# Patient Record
Sex: FEMALE | Race: WHITE | HISPANIC OR LATINO | Employment: FULL TIME | URBAN - METROPOLITAN AREA
[De-identification: names, ages, dates, MRNs, and addresses within clinical notes are randomized per-mention and may not be internally consistent; named-entity substitution may affect disease eponyms.]

---

## 2023-01-09 DIAGNOSIS — N94.810 VULVAR VESTIBULITIS: ICD-10-CM

## 2023-01-09 DIAGNOSIS — N94.10 DYSPAREUNIA IN FEMALE: Primary | ICD-10-CM

## 2023-01-12 ENCOUNTER — CLINICAL SUPPORT (OUTPATIENT)
Dept: REHABILITATION | Facility: HOSPITAL | Age: 38
End: 2023-01-12
Payer: COMMERCIAL

## 2023-01-12 DIAGNOSIS — N94.10 DYSPAREUNIA IN FEMALE: ICD-10-CM

## 2023-01-12 DIAGNOSIS — N94.810 VULVAR VESTIBULITIS: ICD-10-CM

## 2023-01-12 DIAGNOSIS — M62.838 MUSCLE SPASM: Primary | ICD-10-CM

## 2023-01-12 PROCEDURE — 97112 NEUROMUSCULAR REEDUCATION: CPT | Mod: PN | Performed by: PHYSICAL THERAPIST

## 2023-01-12 PROCEDURE — 97161 PT EVAL LOW COMPLEX 20 MIN: CPT | Mod: PN | Performed by: PHYSICAL THERAPIST

## 2023-01-12 NOTE — PATIENT INSTRUCTIONS
DIAPHRAGMATIC BREATHING     The diaphragm is a dome shaped muscle that forms the floor of the rib cage. It is the most efficient muscle for breathing and relaxation, although most people are not used to using the diaphragm. Diaphragmatic or belly breathing is an important technique to learn because it helps settle down or relax the autonomic nervous system. The correct use of diaphragmatic breathing can help to quiet brain activity resulting in the relaxation of all the muscles and organs of the body. This is accomplished by slow rhythmic breathing concentrated in the diaphragm muscle rather than the chest.    How to do proper relaxation breathing:    Start by lying on your back or reclining in a chair in a relaxed position. Place one hand on your chest and the other on your abdomen.  Relax your jaw by placing your tongue on the floor of your mouth and keeping your teeth slightly apart.   Take a deep breath in, letting the abdomen expand and rise while you keep your upper chest, neck and shoulders relaxed.   As you breathe out, allow your abdomen and chest to fall. Exhale completely.  It doesn't matter if you breathe in/out through your nose and/or mouth. Do whichever feels comfortable.  Remember to breathe slowly.  Do not force your breathing. Do not hold your breath.  Repeat for 5 minutes every day.

## 2023-01-12 NOTE — PLAN OF CARE
[facility]   Physical Therapy Initial Evaluation     Date: 2023   Name: Joselo Schaefer  Clinic Number: [id number]    Therapy Diagnosis:   Encounter Diagnoses   Name Primary?    Dyspareunia in female     Vulvar vestibulitis     Muscle spasm Yes     Physician: Tiana Jaimes MD    Physician Orders: PT Eval and Treat PF PT  Medical Diagnosis from Referral: N94.10 (ICD-10-CM) - Dyspareunia in female N94.810 (ICD-10-CM) - Vulvar vestibulitis   Evaluation Date: 2023  Authorization Period Expiration: 2024  Plan of Care Expiration: 2023  Progress Note Due: 2023  Visit #  Visits authorized:    FOTO: 1/3    Precautions: Standard     Time In: 2:41 PM   Time Out: 3:25 PM   Total Appointment Time (timed & untimed codes): 44 minutes      HISTORY      Joselo is a 37 y.o. female evaluated on 2023    Physician:  Tiana Jaimes MD   Diagnosis:   Encounter Diagnoses   Name Primary?    Dyspareunia in female     Vulvar vestibulitis     Muscle spasm Yes      Treatment ordered: Physical Therapy  Medical History: No past medical history on file.   Surgical History:   Past Surgical History:   Procedure Laterality Date     SECTION      x2    TUBAL LIGATION        Medications:   Current Outpatient Medications   Medication Sig    clotrimazole-betamethasone 1-0.05% (LOTRISONE) cream Apply twice a day as needed for itching    norethindrone (AYGESTIN) 5 mg Tab Take 1 tablet (5 mg total) by mouth once daily.     No current facility-administered medications for this visit.       Allergies:   Review of patient's allergies indicates:   Allergen Reactions    Diflucan [fluconazole] Other (See Comments)     Spots on face        Precautions: universal    OB/GYN History:  Caesarean x 2    Bladder/Bowel History: straining or pushing to empty bladder      SUBJECTIVE     Date of onset: Forever  History of current complaint: patient states constant pelvic pain. She  "was given a cream (testosterone and estrogen) from Dr. Jaimes, but it gave her a bad rash. She will see Dr. Jaimes on 1/16/23 for a follow up. Reports frequent UTIs - but when she would go to the doctor she did not have a UTI. Feels like she does not empty her bladder well. States that she has to push on her bladder. "You know the days before your period...that's how I feel forever." Patient is from [facility]. She has been in the [place] 10 years. Did see a urologist in [place] that gave her some pills. This did help her empty her bladder, but nothing helped the pain. States that she has mild pain with gyn exam. A little pain afterwards. Will bleed after that exam.   Patient's goals for therapy: decrease pain  Pain: Patient reports 7/10, with 0 being the lowest and 10 being the highest.    Activities that cause symptoms: sexual activity, prolonged sitting, voiding, and defecating    Previous treatment included medication, stretching of the bladder    Sexually active? Yes - pain during and the day after, will bleed after intercourse     Frequency of urination:   Daytime: every hour - after every class during school, drinks more water at school, does not drink as much when at home           Nighttime: 2xs     Difficulty initiating urine stream: Yes  Urine stream: very weak, spills out on her leg, she does sit to urinate  Complete emptying: No  Bladder leakage: No  Frequency of incidents: none  Amount leaked (urine): none    Frequency of bowel movements: once a day  Difficulty initiating BM: No  Quality/Shape of BM: [place] Stool Chart 4  Colon leakage: No  Frequency of incidents: none   Amount leaked (bowels):  none  Form of protection: wears a liner - has a discharge  Number of pads required in 24 hours: 1      Types of fluid intake: water, 2 cups of coffee a day  Diet:regular diet  Current exercise:daily - cardio and weights    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    OBJECTIVE     ORTHO SCREEN  Posture: WNL  Pelvic alignment: no sign of deviations noted in supine    ABDOMINALS  Scarring:  scar   Diastasis: not assessed  Abdominal strength: not assessed    Chaperone: refused  Consent signed: Yes    VAGINAL PELVIC FLOOR EXAM - will be performed next visit    TREATMENT      Neuromuscular Re-education to develop Kinesthetic and Proprioception  for 15 minutes including: diaphragmatic breathing and PF relaxation with use of rehabilitative ultrasound. Pt also with crescent sign indicative of constipation. PVR appears WNL.       Education: instructed on general anatomy/physiology of urinary/bowel system; discussed plan of care with patient and parent/guardian; instructed in benefits/risks of treatment; instructed in alternative methods of treatment; instructed in risks of refusing treatment; patient a parent agreed to treatment plan.     Also educated in: anatomy/physiology of pelvic floor and diaphragmatic breathing    ASSESSMENT      This is a 37 y.o. female referred to outpatient physical therapy and presents with a medical diagnosis of N94.10 (ICD-10-CM) - Dyspareunia in female N94.810 (ICD-10-CM) - Vulvar vestibulitis . Patient will benefit from skilled physical therapy to improve pelvic floor relaxation and mobility, improve bowel and bladder habits, and improve pelvic pain to improve her quality of life .    Educational/Spiritual/Cultural needs: none  Abuse/Neglect: no signs  Nutritional Status: WDWN   Fall Risk: pt is not a fall risk    Pt's spiritual, cultural and educational needs considered and pt agreeable to plan of care and goals as stated below:     Medical necessity is demonstrated by the following IMPAIRMENTS/PROMBLEMS     History  Co-morbidities and personal factors that may impact the plan of care Examination  Body Structures and Functions, activity limitations and participation restrictions that may impact the plan of care Clinical  Presentation   Decision Making/ Complexity Score   Co-morbidities:                 Personal Factors:    Body Regions/Systems/Functions:    poor knowledge of body mechanics and posture, dysfunctional voiding, and dysfunctional defecation           Activity limitations:   Barriers to Learning: none  Environmental Barriers: none noted    Participation Restrictions:           low           PLAN    Frequency: 1xs per week  Duration: 12 weeks    Short Term Goals: 6 weeks   Patient will report urinating ever 2-3 hours.  Patient will be independent with pelvic floor relaxation to improve bowel and bladder evacuation.   Patient will be able to demonstrate improved pelvic floor relaxation and contraction on rehabilitative ultrasound.   Patient will report improved water intake when not at work.   Patient will report pelvic pain of 5/10 at its worst to improve quality of life.    Long Term Goals: 12 weeks   Patient will report urinating every 3-4 hours with strong urine stream to improve quality of life   Patient will deny nocturia to improve sleep.   Patient will report pelvic pain of 3/10 at its worst.   Patient will demonstrate adequate pelvic floor coordination with contraction and relaxation on sEMG vs rehabilitative ultrasound.    Patient will voice compliance with progressive home exercise program.     Physical therapy will include: therapeutic exercises, therapeutic activity, neuromuscular re-education, gait training, manual therapy, patient/family education, and self care/home management  Pt may be seen by PTA as part of the rehabilitation team.     Therapist: Alvin Garcia, PT  1/12/2023

## 2023-03-07 ENCOUNTER — DOCUMENTATION ONLY (OUTPATIENT)
Dept: REHABILITATION | Facility: HOSPITAL | Age: 38
End: 2023-03-07
Payer: COMMERCIAL

## 2023-03-07 NOTE — PROGRESS NOTES
Pelvic Health Physical Therapy   Discharge Summary     Name: Joselo Schaefer  Clinic Number: [id number]     Therapy Diagnosis:        Encounter Diagnoses   Name Primary?    Dyspareunia in female      Vulvar vestibulitis      Muscle spasm Yes      Physician: Tiana Jaiems MD         Physician Orders: PT Eval and Treat PF PT  Medical Diagnosis from Referral: N94.10 (ICD-10-CM) - Dyspareunia in female N94.810 (ICD-10-CM) - Vulvar vestibulitis   Evaluation Date: 1/12/2023  Authorization Period Expiration: 1/9/2024  Plan of Care Expiration: 4/6/2023  Progress Note Due: 2/9/2023  Visit # 1/12 Visits authorized: 1/ 1   FOTO: 1/3    Cancelled Visits: 1 (2-2-23)  No Show Visits: 1 (2-8-23)      Assessment     Patient did not complete plan of care. Unsuccessful at attempts to contact patient. Patient only attended pelvic floor evaluation on 1/12/23.      Short Term Goals: 6 weeks   Patient will report urinating ever 2-3 hours. Goal not met  Patient will be independent with pelvic floor relaxation to improve bowel and bladder evacuation. Goal not met  Patient will be able to demonstrate improved pelvic floor relaxation and contraction on rehabilitative ultrasound. Goal not met  Patient will report improved water intake when not at work. Goal not met  Patient will report pelvic pain of 5/10 at its worst to improve quality of life.Goal not met     Long Term Goals: 12 weeks   Patient will report urinating every 3-4 hours with strong urine stream to improve quality of life Goal not met  Patient will deny nocturia to improve sleep. Goal not met  Patient will report pelvic pain of 3/10 at its worst. Goal not met  Patient will demonstrate adequate pelvic floor coordination with contraction and relaxation on sEMG vs rehabilitative ultrasound.  Goal not met  Patient will voice compliance with progressive home exercise program. Goal not met    Plan     Discharge physical therapy at this time.     Alvin Garcia, PT

## 2024-02-20 ENCOUNTER — OFFICE VISIT (OUTPATIENT)
Dept: INTERNAL MEDICINE | Facility: CLINIC | Age: 39
End: 2024-02-20
Payer: COMMERCIAL

## 2024-02-20 VITALS
RESPIRATION RATE: 20 BRPM | HEIGHT: 63 IN | OXYGEN SATURATION: 99 % | BODY MASS INDEX: 21.33 KG/M2 | HEART RATE: 68 BPM | SYSTOLIC BLOOD PRESSURE: 104 MMHG | WEIGHT: 120.38 LBS | DIASTOLIC BLOOD PRESSURE: 66 MMHG

## 2024-02-20 DIAGNOSIS — Z76.89 ENCOUNTER TO ESTABLISH CARE: Primary | ICD-10-CM

## 2024-02-20 DIAGNOSIS — E87.6 HYPOKALEMIA: ICD-10-CM

## 2024-02-20 DIAGNOSIS — R79.89 ABNORMAL LFTS: ICD-10-CM

## 2024-02-20 DIAGNOSIS — R53.83 FATIGUE, UNSPECIFIED TYPE: ICD-10-CM

## 2024-02-20 DIAGNOSIS — M79.7 FIBROMYALGIA: ICD-10-CM

## 2024-02-20 DIAGNOSIS — R17 ELEVATED BILIRUBIN: ICD-10-CM

## 2024-02-20 DIAGNOSIS — Z12.31 SCREENING MAMMOGRAM FOR BREAST CANCER: ICD-10-CM

## 2024-02-20 PROCEDURE — 3074F SYST BP LT 130 MM HG: CPT | Mod: CPTII,S$GLB,, | Performed by: NURSE PRACTITIONER

## 2024-02-20 PROCEDURE — 3078F DIAST BP <80 MM HG: CPT | Mod: CPTII,S$GLB,, | Performed by: NURSE PRACTITIONER

## 2024-02-20 PROCEDURE — 99999 PR PBB SHADOW E&M-EST. PATIENT-LVL IV: CPT | Mod: PBBFAC,,, | Performed by: NURSE PRACTITIONER

## 2024-02-20 PROCEDURE — 1159F MED LIST DOCD IN RCRD: CPT | Mod: CPTII,S$GLB,, | Performed by: NURSE PRACTITIONER

## 2024-02-20 PROCEDURE — 99204 OFFICE O/P NEW MOD 45 MIN: CPT | Mod: S$GLB,,, | Performed by: NURSE PRACTITIONER

## 2024-02-20 PROCEDURE — 3008F BODY MASS INDEX DOCD: CPT | Mod: CPTII,S$GLB,, | Performed by: NURSE PRACTITIONER

## 2024-02-20 RX ORDER — ZINC GLUCONATE 100 MG
100 TABLET ORAL EVERY OTHER DAY
COMMUNITY
End: 2024-02-20

## 2024-02-20 RX ORDER — VITAMIN B COMPLEX
1 CAPSULE ORAL DAILY
COMMUNITY

## 2024-02-20 NOTE — PROGRESS NOTES
Subjective:           Patient ID: Joselo Schaefer is a 39 y.o. female.    Chief Complaint: Results    Joselo Schaefer is a 39 y.o. female here to establish   Hs wellness labs from work to review;   Notes extreme fatigue and diffuse pain   Thinks she may be isamar-menopausal ; mother dx in her 30s   She follows with GYN     Prior Dx fibromyalgia   K+ low- 3.3 and   Bilirubin slightly elevated  LFTs slightly elevated   Denies recent illness,   Denies alcohol use  She follows a very healthy diet ; lipids panel excellent; HDL 90s, LDL 60s , Total chol and TG normal     She has had large panel workup a few years ago with wellness center;   Included Tsh and hormones   She was prescribed - pregnelone 10mg daily   Progenolone level 34,   Estradiol 118  Estrace 45   Testosterone 19    TSH 0.91, T4- 7.6, T3- 3.2   CRP 0.3  CHARLES,  uric acid normal, HGB A1C 4.9    Cpeptide- 0.73,   B12- 548  Folate- 10   Cbc normal             Review of Systems   Constitutional:  Positive for fatigue. Negative for chills and fever.   HENT:  Negative for congestion, ear pain, postnasal drip, sinus pressure, sneezing and sore throat.    Eyes:  Negative for discharge.   Respiratory:  Negative for cough, chest tightness and shortness of breath.    Cardiovascular: Negative.    Gastrointestinal:  Negative for abdominal pain, constipation, diarrhea, nausea and vomiting.   Genitourinary:  Negative for difficulty urinating, flank pain and hematuria.   Musculoskeletal:  Positive for myalgias. Negative for arthralgias and joint swelling.   Skin: Negative.    Neurological:  Negative for dizziness and headaches.   Psychiatric/Behavioral:  Negative for behavioral problems and confusion.        Objective:      Physical Exam  Constitutional:       Appearance: She is well-developed.   HENT:      Head: Normocephalic and atraumatic.      Nose: Nose normal.   Eyes:      Pupils: Pupils are equal, round, and reactive to light.   Neck:      Vascular: No  carotid bruit.   Cardiovascular:      Rate and Rhythm: Normal rate and regular rhythm.      Heart sounds: No murmur heard.  Pulmonary:      Effort: Pulmonary effort is normal.      Breath sounds: Normal breath sounds.   Abdominal:      General: Bowel sounds are normal.      Palpations: Abdomen is soft.   Musculoskeletal:         General: Tenderness present. Normal range of motion.      Cervical back: Normal range of motion and neck supple. No tenderness.      Comments: Diffuse tenderness to bilateral arms, chest, legs bilaterally    Lymphadenopathy:      Cervical: No cervical adenopathy.   Skin:     General: Skin is warm and dry.      Capillary Refill: Capillary refill takes less than 2 seconds.      Coloration: Skin is not jaundiced or pale.   Neurological:      Mental Status: She is alert and oriented to person, place, and time.      Cranial Nerves: No cranial nerve deficit.      Sensory: No sensory deficit.   Psychiatric:         Behavior: Behavior normal.         Thought Content: Thought content normal.         Judgment: Judgment normal.         Assessment:       1. Encounter to establish care    2. Fatigue, unspecified type    3. Abnormal LFTs    4. Fibromyalgia    5. Screening mammogram for breast cancer    6. Hypokalemia    7. Elevated bilirubin        Plan:   1. Encounter to establish care    2. Fatigue, unspecified type  -     ESTRADIOL; Future; Expected date: 02/20/2024  -     PROGESTERONE; Future; Expected date: 02/20/2024  -     FOLLICLE STIMULATING HORMONE; Future; Expected date: 02/20/2024    3. Abnormal LFTs  -     US Abdomen Limited_Liver; Future; Expected date: 02/20/2024  -     HEPATITIS C ANTIBODY; Future; Expected date: 02/20/2024  -     Comprehensive Metabolic Panel; Future; Expected date: 02/20/2024    4. Fibromyalgia    5. Screening mammogram for breast cancer  -     Mammo Digital Screening Bilat; Future; Expected date: 02/20/2024    6. Hypokalemia  -     Comprehensive Metabolic Panel; Future;  Expected date: 02/20/2024    7. Elevated bilirubin  -     Comprehensive Metabolic Panel; Future; Expected date: 02/20/2024       F/u after labs and US, MMG

## 2024-03-06 ENCOUNTER — HOSPITAL ENCOUNTER (OUTPATIENT)
Dept: RADIOLOGY | Facility: HOSPITAL | Age: 39
Discharge: HOME | End: 2024-03-06
Attending: NURSE PRACTITIONER
Payer: COMMERCIAL

## 2024-03-06 VITALS — WEIGHT: 120 LBS | BODY MASS INDEX: 21.26 KG/M2 | HEIGHT: 63 IN

## 2024-03-06 DIAGNOSIS — R79.89 ABNORMAL LFTS: ICD-10-CM

## 2024-03-06 DIAGNOSIS — Z12.31 SCREENING MAMMOGRAM FOR BREAST CANCER: ICD-10-CM

## 2024-03-06 PROCEDURE — 77067 SCR MAMMO BI INCL CAD: CPT | Mod: TC

## 2024-03-06 PROCEDURE — 76705 ECHO EXAM OF ABDOMEN: CPT | Mod: TC

## 2024-03-06 PROCEDURE — 77063 BREAST TOMOSYNTHESIS BI: CPT | Mod: 26,,, | Performed by: RADIOLOGY

## 2024-03-06 PROCEDURE — 76705 ECHO EXAM OF ABDOMEN: CPT | Mod: 26,,, | Performed by: RADIOLOGY

## 2024-03-06 PROCEDURE — 77067 SCR MAMMO BI INCL CAD: CPT | Mod: 26,,, | Performed by: RADIOLOGY

## 2024-03-08 ENCOUNTER — TELEPHONE (OUTPATIENT)
Dept: INTERNAL MEDICINE | Facility: CLINIC | Age: 39
End: 2024-03-08
Payer: COMMERCIAL

## 2024-03-08 DIAGNOSIS — R92.8 ABNORMALITY OF RIGHT BREAST ON SCREENING MAMMOGRAM: Primary | ICD-10-CM

## 2024-03-08 NOTE — TELEPHONE ENCOUNTER
OB/GYN Discharge Instructions:  Contact office with increased bleeding, fever, or increasing sadness.  Contact office with developing headache, vision changes (blurry, spots or sparkles), front right side pain just below rib cage, or heartburn that does not go away.    Go to Emergency Room with shortness of breath or chest pain.  If need to go to an emergency room, please go to [facility] in [place].    Use stool softener for at minimum 1 week or drink prune juice/eat prunes 1-2 times per day.  Pelvic rest (no intercourse) x 6 weeks.    Follow up with OB/GYN, Dr. Mahsa Cantor in 6 weeks for Post-Partum Check.  .  Prescriptions sent to preferred pharmacy.    After Your Vaginal Delivery Discharge Instructions (Dr. Cantor)    After Discharge Orders:  Please call soon to schedule a follow up appointment for 6 weeks.      Medications:   Please resume prenatal vitamins  Tylenol 325 mg or 500 mg (whichever you have at home) one or 2 tablets by mouth every 4-6 hours as needed for pain.  This is an over the counter medication  Ibuprofen 600mg take one tablet by mouth every 6 hours until you are pain free.  Then you can space it out.  Iron 325 mg one tablet by mouth daily. Start after you are moving your bowels well (about 1 week).  This is an over the counter medication.  Take prunes or prune juice twice a day for constipation.  If you can't tolerate prunes, then use Colace 100 mg.  Take one tablet by mouth daily as needed for constipation. This is an over the counter medication.    Medical equipment: none    After your delivery - Please notify your physician if you have these signs and symptoms:  You will have irregular menses/ vaginal bleeding after having a baby.  This should decrease in amount over time, but you may continue to bleed LIGHTLY for 6-8 weeks.  Your first period may be heavier than periods you had prior to having your baby.  If you develop lightheadedness, fast heartbeat, or become dizzy  Orders placed    because you are bleeding heavily, you should call your doctor.  If you are completely soaking a pad an hour, this is too much bleeding and you should call your doctor.  Fever - Oral temperature greater than 100.4 degrees Fahrenheit   Foul-smelling vaginal discharge  Headache unrelieved by \"pain meds\"  Difficulty urinating  Breasts reddened, hard, hot to the touch  Nipple discharge which is foul-smelling or contains pus  Difficulty breathing with or without chest pain  New calf pain especially if only on one side  Sudden, continuing increased vaginal bleeding with or without clots  Unrelieved feelings of:  Inability to cope  Sadness  Anxiety  Lack of interest in baby  Insomnia  Crying     What to do at home:  See patient education handouts for full information  Take it easy for 2 weeks.  After 2 weeks you can start to increase your activity.  After 3 weeks you can exercise full strength.  No driving until you are pain free.  This may take a few days or even a week or more.   Nothing per vagina for 6 weeks, no sexual intercourse, no douching, no tampons for 6 weeks  Soak in a bathtub twice a day in plain hot water.  This is especially important to heal stitches.  Drink a 3-4 liters of water per day.  This will help with milk production and avoid constipation.  Take care of yourself by sleeping/resting as much as possible   Eat regular nutritious meals  Let someone else care for you, your baby, and housework as much as possible   Take pain medication as prescribed whenever you need them  Wear compression stockings if prescribed   To avoid/relieve constipation take stool softeners if advised   Drink lots of water/fruit juices  Increase fiber in your diet  Breast care: Wear support bra    Refer to  Discharge Instructions for problems or follow-up regarding feeding  If breast feeding, Jennifer Figueroa (Nurse Practitioner) in our office is a lactation consultant.            POSTPARTUM DISCHARGE SUMMARY:    DISPOSITION  STATUS NOTE:  Date:2023Time:11:50 AM Mode:wheelchair Accompanied by:family member  Disposition:home  Outcomes achieved per plan of care.  Discharged in stable condition.    Pamphlet / Instructions given: see \"A New Beginning\" booklet,        Mom Teaching    Bleeding   Everyone's postpartum bleeding is different. Initially, your bleeding will start bright red and will transition to pink then to a brownish color over the next week to two weeks. It is common to experience abdominal cramping which can last up to four days after delivery. Bleeding on a darryl pad can last for four to six weeks; just enough that you need to wear a panty liner. If you are breastfeeding, you may get your period, or you could go up to six months to one year without one.    You could still get pregnant even if you do not have your period. Contact your doctor if you pass a blood clot larger than a golf ball or if your vaginal bleeding is soaking a pad in an hour. Do not use tampons until you are cleared by your doctor.      With a , contact your doctor if your incision is swollen, oozing, warm to the touch, or increased pain at the incision site. You will need an appointment to have your staples/stitches looked at or removed in one to two weeks.    Perineum  If you received stitches on your bottom they will dissolve and will not need to be removed. You may see a stitch in the toilet, this is OK. If provided, use Dermoplast spray or witch-jose alfredo/tucks pads to ease perineal discomfort. You can also soak your bottom in a bathtub with shallow plain warm water (no soaps or oils) for twenty minutes to help with discomfort. Continue to use your darryl bottle to rinse off your bottom until your bleeding stops to prevent infection.     Exercise   Kegel exercises can be performed to help strengthen your pelvic floor. To perform Kegel exercises, you tighten your bottom and then loosen it up. When you start to urinate and stop on purpose,  repeating those steps, you are using your Kegel muscles. No vigorous exercise for six weeks. You can go on short walks. After four weeks, you can tighten then loosen your stomach. You can also do pelvic tilts.     Postpartum Blues/Depression  It is common to have postpartum blues after delivery. Some mothers may feel emotional changes after birth including: crying, anxiety, feeling overwhelmed, or irritable. These feelings can be normal due to hormonal changes.     We are concerned if you do not want to touch/care for baby, care for yourself, not sleeping or sleeping too much, constant anxiety, or feeling complete loss of control. Contact your doctor if you are having any of these symptoms as you might be experiencing postpartum depression.     Often it is family or friends that recognize these symptoms before you notice them yourself. Postpartum depression can occur any time after delivery up to one year. It is caused by a hormonal imbalance and it is important to seek medical care. If you have feelings of harming yourself or your baby seek emergency care.     Here are some additional resources offering support for Postpartum Depression:    Postpartum Support International: (105) 727-3256   Mom's Mental Health Initiative ([facility]) https://momsmentalhealthmke.org/  Mental Health [facility] (402) 310-4451 or (474) 357-7728   National [place] for Mental Health: (765) 828-8900  National Suicide Prevention (098) 973-6464  Postpartum Progress https://postpartumprogress.com/       Postpartum Preeclampsia: A serious condition that occurs when a woman has high blood pressure and excess protein in her urine soon after childbirth. It usually occurs within a few days of birth, but can develop up to 6 weeks after having the baby. Preeclampsia can result in seizures and other serious complications and requires prompt treatment.     Call your doctor or midwife immediately if you experience any of the  following symptoms that could be signs of Preeclampsia:  Increased swelling in your face, hands or legs  severe headache that doesn't go away  abdominal pain  shortness of breath / difficulty breathing  nausea and vomiting   confusion  vision changes:blurred vision or flashing spots   gain more than 3 pounds in three days     Sex   Discuss birth control options with your doctor. If you are nursing, your vagina can become dry. Because of this, you may need to use KY jelly or a personal lubricant during sexual intercourse. You should be on vaginal rest until your six-week postpartum appointment; no tampons, douching, or sexual intercourse. If you are breastfeeding and have not gotten your period, you could still get pregnant.     Breast Care   If you are not breastfeeding, wear a bra at all times for about one week. Try not to touch or stimulate your breasts. When you take a shower, let the water hit your back instead of your breasts. If your breasts become engorged (hard and painful due to increased breast milk) make sure you are wearing a bra. Engorgement usually occurs day three to four postpartum. If it becomes very uncomfortable put an ice bag on your breasts.     If you are breastfeeding, wear a bra all the time. You may need to wear nursing pads due to breast milk leaking. Drink six to eight glasses of fluid per day. You may run a low temperature when your milk comes in, call your doctor if you have a temperature over 100.4 degrees F. Putting warm compresses on your breasts right before nursing can help with milk letdown. Lanolin cream can be applied directly to the nipples before and/or after feedings to help with soreness or cracking. Avoid using soap on the nipples- it may cause dryness or cracking.    A few problems can arise with nursing including becoming engorged, clogged duct, or mastitis. Engorgement occurs when your breasts become very firm which may cause difficulty getting the baby to latch  correctly on the breast. You may have to express some milk first, and then place the baby on the breast. This usually occurs three to four days after birth and can last forty-eight hours.   A clogged duct is when you develop a lump on your breast and the breast milk cannot flow through. You will need to apply a hot pack for twenty minutes (warm moist compress) and then place the baby to breast. Try to massage the lump out while nursing.     Lastly, you could develop a breast infection called mastitis. Your breast/nipple could become reddened and the nipple could become so tender that it is hard to allow the baby to nurse from it. You may have a fever and chills and overall not feel well. Continue to nurse and call your doctor for a prescription. Your milk is not infected and is safe for the  to continue to eat.     Urination/Bowel Movements   You may find you have to urinate more frequently than normal in the first week after delivery. If you are experiencing any urinary burning, bleeding (separate from vaginal bleeding), frequency or hesitancy call your doctor as you might have a urinary tract infection (UTI). Swelling in legs and feet is common as you are still retaining fluid from pregnancy and the delivery. Contact your doctor if your swelling becomes a concern.     You should have a bowel movement in a day or two after delivery. Your stitches will not rip open. It is important to drink plenty of fluids and to take a stool softener if recommended by your doctor.       Diet   Eat a well-balance diet including plenty of fiber and fluids such as water and juices. Continue taking a prenatal or women's multivitamin daily if recommended by your doctor.     Activity Overview  Bathing: Shower or bathe as needed  Driving: According to your doctor  Return to work or school: According to your doctor  Lifting/bending: Lift nothing heavier than 10 pounds, bend at the knees, not at the hips  Stair climbing: Limit  trips  Resuming sexual activity: According to your doctor  Housework: Limit for first 2 weeks  Exercise: Walking is fine, check with your doctor about more strenuous activities  Other: No douching or tampons     Self Care (pg 6-8)  Vaginal discharge may continue for ten days to six weeks, becoming light in color and amount.  Continue to use your darryl bottle, witch hazel/tucks, Dermoplast spray, or sitz bath until bleeding stops or discomfort is gone.  Breast care: Use lanolin ointment/cream as needed.  Apply warm compresses five minutes prior to breastfeeding to relieve engorgement.   If you had a  section or tubal ligation, keep incision clean and dry.  Manage time in order to allow yourself enough time for rest and sleep. Try taking several short naps a day and sleep when baby sleeps.  Accept help from friends/family if offered including meals, housework or help with baby.   Local Community Resources:            [facility]: [phone]  [facility]: [phone]  [facility]: [phone]    Notify your doctor/midwife if:  Your temperature is over 100.4 degrees F.  You notice hard, red, warm, or painful areas in your breasts or legs.  Your vaginal discharge becomes foul-smelling, increases in amount, soaks more than one pad in one hour, is bright red, or you pass clots bigger than a golf ball.  You have difficulty going to the bathroom.  You notice swelling, drainage, warmth, separation, or tenderness at your incision.  You have symptoms of postpartum depression (loss of appetite, feelings of hopelessness or loss of control, inability to sleep).  You have a severe headache, increased swelling in hands/feet, blurry vision, seeing spots, pain in your right upper abdomen, and/or elevated blood pressures.  You should not have any warmth, redness, or pain in your calf or legs. This could mean you have a blood clot and should call your doctor  immediately.     Follow-up Care  Please call your doctor's office to schedule your six week follow-up appointment for a vaginal delivery. Follow up with your doctor in one to two weeks after a  section delivery    Referral(s): No       To:    Special Instructions:      A copy of this form was provided to and reviewed with Anabell Jerome by  ROQUE, 2023.  Patient verbalized understanding and has no further questions at this time.

## 2024-03-08 NOTE — PROGRESS NOTES
Please call the patient regarding her abnormal result. Right breast focal asymmetry at the upper central posterior position. Radiologist recommending Diagnostic MMG and or US ; will place orders;   Please note/report  labs and Abdominal US when calling

## 2024-06-12 ENCOUNTER — HOSPITAL ENCOUNTER (OUTPATIENT)
Dept: RADIOLOGY | Facility: HOSPITAL | Age: 39
Discharge: HOME | End: 2024-06-12
Attending: NURSE PRACTITIONER
Payer: COMMERCIAL

## 2024-06-12 DIAGNOSIS — R92.8 ABNORMALITY OF RIGHT BREAST ON SCREENING MAMMOGRAM: ICD-10-CM

## 2024-06-12 PROCEDURE — 77066 DX MAMMO INCL CAD BI: CPT | Mod: 26,,, | Performed by: RADIOLOGY

## 2024-06-12 PROCEDURE — 77066 DX MAMMO INCL CAD BI: CPT | Mod: TC

## 2024-06-12 PROCEDURE — 76642 ULTRASOUND BREAST LIMITED: CPT | Mod: 26,50,, | Performed by: RADIOLOGY

## 2024-06-12 PROCEDURE — 77062 BREAST TOMOSYNTHESIS BI: CPT | Mod: 26,,, | Performed by: RADIOLOGY

## 2024-06-12 PROCEDURE — 76642 ULTRASOUND BREAST LIMITED: CPT | Mod: TC,50

## 2024-06-12 NOTE — PROGRESS NOTES
Normal result; there right breast shows a simple cyst with clear margins  NO suspicious masses or evidence of malignancy   Will plan for repeat yearly MMG screening

## 2024-12-02 PROBLEM — R10.2 PELVIC PAIN: Chronic | Status: ACTIVE | Noted: 2024-12-02

## 2024-12-02 PROBLEM — N92.1 MENOMETRORRHAGIA: Chronic | Status: ACTIVE | Noted: 2024-12-02

## 2024-12-02 PROBLEM — N94.10 DYSPAREUNIA, FEMALE: Chronic | Status: ACTIVE | Noted: 2024-12-02

## 2024-12-02 PROBLEM — N94.5 SECONDARY DYSMENORRHEA: Status: ACTIVE | Noted: 2024-12-02

## 2024-12-03 PROBLEM — R33.9 INCOMPLETE BLADDER EMPTYING: Status: ACTIVE | Noted: 2024-12-03

## 2024-12-03 PROBLEM — N30.11 INTERSTITIAL CYSTITIS (CHRONIC) WITH HEMATURIA: Status: ACTIVE | Noted: 2024-12-03

## 2024-12-14 PROBLEM — N92.1 MENOMETRORRHAGIA: Chronic | Status: RESOLVED | Noted: 2024-12-02 | Resolved: 2024-12-14

## 2024-12-14 PROBLEM — R10.2 PELVIC PAIN: Chronic | Status: RESOLVED | Noted: 2024-12-02 | Resolved: 2024-12-14

## 2024-12-14 PROBLEM — N80.9 ENDOMETRIOSIS: Chronic | Status: ACTIVE | Noted: 2024-12-14

## 2024-12-14 PROBLEM — N94.10 DYSPAREUNIA, FEMALE: Chronic | Status: RESOLVED | Noted: 2024-12-02 | Resolved: 2024-12-14

## 2024-12-14 PROBLEM — N94.5 SECONDARY DYSMENORRHEA: Status: RESOLVED | Noted: 2024-12-02 | Resolved: 2024-12-14

## 2025-03-03 ENCOUNTER — TELEPHONE (OUTPATIENT)
Dept: OBSTETRICS AND GYNECOLOGY | Facility: CLINIC | Age: 40
End: 2025-03-03
Payer: COMMERCIAL

## 2025-03-11 ENCOUNTER — OFFICE VISIT (OUTPATIENT)
Dept: GASTROENTEROLOGY | Facility: CLINIC | Age: 40
End: 2025-03-11
Payer: COMMERCIAL

## 2025-03-11 VITALS
HEIGHT: 63 IN | BODY MASS INDEX: 21.53 KG/M2 | WEIGHT: 121.5 LBS | HEART RATE: 99 BPM | SYSTOLIC BLOOD PRESSURE: 115 MMHG | DIASTOLIC BLOOD PRESSURE: 80 MMHG

## 2025-03-11 DIAGNOSIS — R14.0 BLOATING: ICD-10-CM

## 2025-03-11 DIAGNOSIS — R10.84 GENERALIZED ABDOMINAL PAIN: ICD-10-CM

## 2025-03-11 DIAGNOSIS — K59.00 CONSTIPATION, UNSPECIFIED CONSTIPATION TYPE: Primary | ICD-10-CM

## 2025-03-11 PROCEDURE — 99999 PR PBB SHADOW E&M-EST. PATIENT-LVL IV: CPT | Mod: PBBFAC,,, | Performed by: NURSE PRACTITIONER

## 2025-03-11 PROCEDURE — 3008F BODY MASS INDEX DOCD: CPT | Mod: CPTII,S$GLB,, | Performed by: NURSE PRACTITIONER

## 2025-03-11 PROCEDURE — 99204 OFFICE O/P NEW MOD 45 MIN: CPT | Mod: S$GLB,,, | Performed by: NURSE PRACTITIONER

## 2025-03-11 PROCEDURE — 3074F SYST BP LT 130 MM HG: CPT | Mod: CPTII,S$GLB,, | Performed by: NURSE PRACTITIONER

## 2025-03-11 PROCEDURE — 1159F MED LIST DOCD IN RCRD: CPT | Mod: CPTII,S$GLB,, | Performed by: NURSE PRACTITIONER

## 2025-03-11 PROCEDURE — 3079F DIAST BP 80-89 MM HG: CPT | Mod: CPTII,S$GLB,, | Performed by: NURSE PRACTITIONER

## 2025-03-11 PROCEDURE — 1160F RVW MEDS BY RX/DR IN RCRD: CPT | Mod: CPTII,S$GLB,, | Performed by: NURSE PRACTITIONER

## 2025-03-11 RX ORDER — CHOLECALCIFEROL (VITAMIN D3) 25 MCG
10000 TABLET ORAL DAILY
COMMUNITY

## 2025-03-11 RX ORDER — MULTIVIT WITH MINERALS/HERBS
1 TABLET ORAL DAILY
COMMUNITY

## 2025-03-12 ENCOUNTER — RESULTS FOLLOW-UP (OUTPATIENT)
Dept: GASTROENTEROLOGY | Facility: CLINIC | Age: 40
End: 2025-03-12

## 2025-03-13 RX ORDER — DICYCLOMINE HYDROCHLORIDE 20 MG/1
20 TABLET ORAL 3 TIMES DAILY PRN
Qty: 90 TABLET | Refills: 2 | Status: SHIPPED | OUTPATIENT
Start: 2025-03-13

## 2025-03-13 NOTE — PROGRESS NOTES
Subjective:       Patient ID: Joselo Schaefer is a 40 y.o. female.    Chief Complaint: Bloated and Constipation    39 y/o female with hx of fibromyalgia presents to clinic with c/o lower abdominal pain, bloating, and constipation. Patient states symptoms started after hysterectomy 2024. She reports constant generalized abdominal pain worse in the lower abdomen. Pain described as cramping and entire abdomen is tender to touch. No associated nausea, vomiting. Pain worse with certain movements. No relief with BM. She is constipated. Has BM every 2-3 days. Has tried OTC stool softeners with some relief. No hematochezia or melena. She has CT A/P scheduled later this week.           Past Medical History:   Diagnosis Date    Fibromyalgia        Past Surgical History:   Procedure Laterality Date     SECTION      x2    CHOLECYSTECTOMY      CYSTOSCOPY WITH HYDRODISTENSION OF BLADDER N/A 2024    Procedure: CYSTOSCOPY, WITH BLADDER HYDRODISTENSION W/DMSO;  Surgeon: Dante Cadet MD;  Location: [facility] OR;  Service: Urology;  Laterality: N/A;    DILATION OF URETHRA N/A 2024    Procedure: DILATION, URETHRA - BU;  Surgeon: Dante Cadet MD;  Location: [facility] OR;  Service: Urology;  Laterality: N/A;    EYE SURGERY      as a child    HYSTERECTOMY, TOTAL, LAPAROSCOPIC, WITH SALPINGECTOMY Bilateral 2024    Procedure: HYSTERECTOMY,TOTAL,LAPAROSCOPIC,WITH SALPINGECTOMY;  Surgeon: Kira Godwin MD;  Location: [facility] OR;  Service: OB/GYN;  Laterality: Bilateral;    LAPAROSCOPIC CHOLECYSTECTOMY      LAPAROSCOPIC OOPHORECTOMY Right 2024    Procedure: OOPHORECTOMY, LAPAROSCOPIC;  Surgeon: Kira Godwin MD;  Location: [facility] OR;  Service: OB/GYN;  Laterality: Right;    SURGICAL REMOVAL OF ENDOMETRIOSIS N/A 2024    Procedure: DESTRUCTION, ENDOMETRIOSIS;  Surgeon: Kira Godwin MD;  Location: [facility] OR;  Service: OB/GYN;  Laterality: N/A;     TUBAL LIGATION  2015       Family History   Problem Relation Name Age of Onset    Hypertension Mother      Diabetes Father      Breast cancer Maternal Aunt      Ovarian cancer Maternal Aunt      Colon cancer Neg Hx         Social History     Socioeconomic History    Marital status:    Tobacco Use    Smoking status: Never     Passive exposure: Never    Smokeless tobacco: Never   Substance and Sexual Activity    Alcohol use: No    Drug use: No    Sexual activity: Yes     Partners: Male     Birth control/protection: None     Social Drivers of Health     Financial Resource Strain: Low Risk  (12/13/2024)    Overall Financial Resource Strain (CARDIA)     Difficulty of Paying Living Expenses: Not hard at all   Food Insecurity: No Food Insecurity (12/13/2024)    Hunger Vital Sign     Worried About Running Out of Food in the Last Year: Never true     Ran Out of Food in the Last Year: Never true   Transportation Needs: No Transportation Needs (12/13/2024)    TRANSPORTATION NEEDS     Transportation : No   Physical Activity: Sufficiently Active (11/4/2024)    Exercise Vital Sign     Days of Exercise per Week: 4 days     Minutes of Exercise per Session: 70 min   Stress: No Stress Concern Present (12/13/2024)    [place] [employer] - Occupational Stress Questionnaire     Feeling of Stress : Not at all   Recent Concern: Stress - Stress Concern Present (11/4/2024)    [place] [employer] - Occupational Stress Questionnaire     Feeling of Stress : To some extent   Housing Stability: Unknown (12/13/2024)    Housing Stability Vital Sign     Unable to Pay for Housing in the Last Year: No     Homeless in the Last Year: No   Recent Concern: Housing Stability - High Risk (11/4/2024)    Housing Stability Vital Sign     Unable to Pay for Housing in the Last Year: Yes       Review of Systems   Constitutional:  Negative for appetite change and unexpected weight change.   HENT:  Negative for  "trouble swallowing.    Respiratory:  Negative for shortness of breath.    Cardiovascular:  Negative for chest pain.   Gastrointestinal:  Positive for abdominal pain and constipation. Negative for blood in stool, nausea, rectal pain and vomiting.   Hematological:  Negative for adenopathy. Does not bruise/bleed easily.   Psychiatric/Behavioral:  Negative for dysphoric mood.          Objective:     Vitals:    03/11/25 0956   BP: 115/80   BP Location: Left arm   Patient Position: Sitting   Pulse: 99   Weight: 55.1 kg (121 lb 7.6 oz)   Height: 5' 3" (1.6 m)          Physical Exam  Constitutional:       General: She is not in acute distress.  HENT:      Head: Normocephalic.   Eyes:      Conjunctiva/sclera: Conjunctivae normal.   Pulmonary:      Effort: Pulmonary effort is normal. No respiratory distress.   Abdominal:      General: Bowel sounds are normal.      Palpations: Abdomen is soft.      Tenderness: There is abdominal tenderness (generalized abdominal tenderness with light palpation). There is no guarding or rebound.   Musculoskeletal:         General: Normal range of motion.      Cervical back: Normal range of motion.   Skin:     General: Skin is warm and dry.   Neurological:      Mental Status: She is alert and oriented to person, place, and time.   Psychiatric:         Mood and Affect: Mood normal.         Behavior: Behavior normal.     Xray of abdomen was independently visualized and reviewed by me and showed moderate stool burden.            Assessment:         ICD-10-CM ICD-9-CM   1. Constipation, unspecified constipation type  K59.00 564.00   2. Generalized abdominal pain  R10.84 789.07   3. Bloating  R14.0 787.3       Plan:       Constipation, unspecified constipation type  -     X-Ray Abdomen Flat And Erect; Future; Expected date: 03/11/2025  -     Fiber supplement daily and Miralax 2-3x/week    Generalized abdominal pain  -     X-Ray Abdomen Flat And Erect; Future; Expected date: 03/11/2025  -     " dicyclomine (BENTYL) 20 mg tablet; Take 1 tablet (20 mg total) by mouth 3 (three) times daily as needed (abdominal pain).  Dispense: 90 tablet; Refill: 2    Bloating  -     X-Ray Abdomen Flat And Erect; Future; Expected date: 03/11/2025  - recommend OTC simethicone as directed, such as Phazyme or Gas-x  -discussed with patient about low gas diet: Reduce or eliminate these foods from your diet: Broccoli, Cauliflower, [place] sprouts, Cabbage, Cooked dried beans, Carbonated beverages (sparkling water, soda, beer, champagne)  Other Causes Of Excess Gas Include:   1) EATING TOO FAST or TALKING WHILE YOU CHEW may cause you to swallow air. This increases the amount of gas in the stomach and may worsen your symptoms.  --> Chew each mouthful completely before swallowing. Take your time.  2) OVEREATING may increase the feeling of being bloated and cause more gas.  --> When you are full, stop eating.  3) CONSTIPATION can increase the amount of normal intestinal gas.  --> Avoid constipation by increasing the amount of fiber in your diet by including whole cereal grains, fresh vegetables (except those in the above list) and fresh fruits. High-fiber foods absorb water and carry it out of the body. When increasing the amount of fiber in your diet, you also need to increase the amount of water that you drink. You should drink at least eight 8-ounce glasses of water (two quarts) per day.        Follow up if symptoms worsen or fail to improve.     Patient's Medications   New Prescriptions    DICYCLOMINE (BENTYL) 20 MG TABLET    Take 1 tablet (20 mg total) by mouth 3 (three) times daily as needed (abdominal pain).   Previous Medications    B COMPLEX VITAMINS TABLET    Take 1 tablet by mouth once daily.    MEDROXYPROGESTERONE (DEPO-SUBQ PROVERA) 104 MG/0.65 ML INJECTION    Inject 0.65 mLs (104 mg total) into the skin every 3 (three) months.    NAPROXEN SODIUM (ANAPROX) 550 MG TABLET    Take 1 tablet (550 mg total) by mouth 3 (three)  times daily with meals.    OXYCODONE-ACETAMINOPHEN (PERCOCET) 5-325 MG PER TABLET    Take 1 tablet by mouth every 4 (four) hours as needed.    VITAMIN D (VITAMIN D3) 1000 UNITS TAB    Take 10,000 Units by mouth once daily.   Modified Medications    No medications on file   Discontinued Medications    No medications on file

## 2025-03-18 ENCOUNTER — OFFICE VISIT (OUTPATIENT)
Dept: OBSTETRICS AND GYNECOLOGY | Facility: CLINIC | Age: 40
End: 2025-03-18
Payer: COMMERCIAL

## 2025-03-18 VITALS
HEART RATE: 92 BPM | HEIGHT: 63 IN | BODY MASS INDEX: 21.82 KG/M2 | SYSTOLIC BLOOD PRESSURE: 110 MMHG | DIASTOLIC BLOOD PRESSURE: 66 MMHG | WEIGHT: 123.13 LBS

## 2025-03-18 DIAGNOSIS — Z79.890 HORMONE REPLACEMENT THERAPY (HRT): ICD-10-CM

## 2025-03-18 DIAGNOSIS — K59.01 SLOW TRANSIT CONSTIPATION: ICD-10-CM

## 2025-03-18 DIAGNOSIS — N95.1 PERIMENOPAUSAL VASOMOTOR SYMPTOMS: Primary | ICD-10-CM

## 2025-03-18 PROCEDURE — 99203 OFFICE O/P NEW LOW 30 MIN: CPT | Mod: S$GLB,,, | Performed by: OBSTETRICS & GYNECOLOGY

## 2025-03-18 PROCEDURE — 3078F DIAST BP <80 MM HG: CPT | Mod: CPTII,S$GLB,, | Performed by: OBSTETRICS & GYNECOLOGY

## 2025-03-18 PROCEDURE — 3074F SYST BP LT 130 MM HG: CPT | Mod: CPTII,S$GLB,, | Performed by: OBSTETRICS & GYNECOLOGY

## 2025-03-18 PROCEDURE — 1159F MED LIST DOCD IN RCRD: CPT | Mod: CPTII,S$GLB,, | Performed by: OBSTETRICS & GYNECOLOGY

## 2025-03-18 PROCEDURE — 99999 PR PBB SHADOW E&M-EST. PATIENT-LVL III: CPT | Mod: PBBFAC,,, | Performed by: OBSTETRICS & GYNECOLOGY

## 2025-03-18 PROCEDURE — 3008F BODY MASS INDEX DOCD: CPT | Mod: CPTII,S$GLB,, | Performed by: OBSTETRICS & GYNECOLOGY

## 2025-03-18 RX ORDER — BUSPIRONE HYDROCHLORIDE 5 MG/1
5 TABLET ORAL DAILY
COMMUNITY
Start: 2025-03-12

## 2025-03-18 RX ORDER — ESTRADIOL 0.5 MG/1
0.5 TABLET ORAL DAILY
Qty: 30 TABLET | Refills: 0 | Status: SHIPPED | OUTPATIENT
Start: 2025-03-18 | End: 2026-03-18

## 2025-03-18 NOTE — PROGRESS NOTES
Subjective:    Patient ID: Joselo Schaefer is a 40 y.o. y.o. female.     Chief Complaint:   Chief Complaint   Patient presents with    Cramping       History of Present Illness:  Joselo presents today complaining of chronic abdominal pain since her hsyterectomy  on 12/13. She was seen in the ED on 12/17 with fever and abdominal pain. She reports she was followed up by her GYN and treated for an infection. She has been seen by GI on 3/13 for the constipation, bloating and abdominal pain. CT scan done from ED visit on 12/17 revealed an ileus.     Patient also reports having hot flashes, night sweats and fatigue since surgery.     A full discussion of the benefit-risk ratio of hormonal replacement therapy was carried out. Improvement in vasomotor and other climacteric symptoms were discussed, including possible improvements in sleep and mood. Reduction of risk for osteoporosis was explained. We discussed the study data showing increased risk of thrombo-embolic events such as myocardial infarction, stroke and also possibly breast cancer with estrogen replacement, and how this might affect her. The range of side effects such as breast tenderness, weight gain and including possible increases in lifetime risk of breast cancer and possible thrombotic complications was discussed. We also discussed ACOG's recommendation to use hormone replacement therapy for the relief of hot flashes alone and to be on the lowest dose possible for the shortest amount of time.  Alternative such as herbal and soy-based products were reviewed as well as behavioral modifications and non hormonal prescription medications. All of her questions about this therapy were answered.        ROS:   Review of Systems   Constitutional:  Negative for activity change, appetite change, chills, diaphoresis, fatigue, fever and unexpected weight change.   HENT:  Negative for mouth sores and tinnitus.    Eyes:  Negative for discharge and visual  disturbance.   Respiratory:  Negative for cough, shortness of breath and wheezing.    Cardiovascular:  Negative for chest pain, palpitations and leg swelling.   Gastrointestinal:  Positive for abdominal pain, bloating and constipation. Negative for blood in stool, diarrhea, nausea and vomiting.   Endocrine: Positive for hot flashes. Negative for diabetes, hair loss, hyperthyroidism and hypothyroidism.   Genitourinary:  Positive for pelvic pain. Negative for dysuria, flank pain, frequency, genital sores, hematuria, urgency, vaginal bleeding, vaginal discharge, vaginal pain, postcoital bleeding and vaginal odor.   Musculoskeletal:  Negative for back pain, joint swelling and myalgias.   Integumentary:  Negative for rash, acne, breast mass, nipple discharge and breast skin changes.   Neurological:  Negative for seizures, syncope, numbness and headaches.   Hematological:  Negative for adenopathy. Does not bruise/bleed easily.   Psychiatric/Behavioral:  Positive for sleep disturbance. Negative for depression. The patient is not nervous/anxious.    Breast: Negative for mass, mastodynia, nipple discharge and skin changes          Objective:    Vital Signs:  Vitals:    03/18/25 1527   BP: 110/66   Pulse: 92       Physical Exam:  General:  alert, cooperative, no distress   Head Normocephalic, without obvious abnormality, atraumatic   Neck .supple, symmetrical, trachea midline   Skin:  Skin color, texture, turgor normal. No rashes or lesions   Abdomen:  soft, non-tender; bowel sounds normal   Pelvis: Deferred   Extremities extremities normal, atraumatic, no cyanosis or edema   Neurologic Grossly normal        Perimenopausal vasomotor symptoms  -     estradioL (ESTRACE) 0.5 MG tablet; Take 1 tablet (0.5 mg total) by mouth once daily.  Dispense: 30 tablet; Refill: 0    Slow transit constipation    Hormone replacement therapy (HRT)  -     estradioL (ESTRACE) 0.5 MG tablet; Take 1 tablet (0.5 mg total) by mouth once daily.   Dispense: 30 tablet; Refill: 0      Bowel regimen  F/u in one month on HRT

## 2025-03-26 ENCOUNTER — PATIENT MESSAGE (OUTPATIENT)
Dept: OBSTETRICS AND GYNECOLOGY | Facility: CLINIC | Age: 40
End: 2025-03-26
Payer: COMMERCIAL

## 2025-03-31 ENCOUNTER — TELEPHONE (OUTPATIENT)
Dept: OBSTETRICS AND GYNECOLOGY | Facility: CLINIC | Age: 40
End: 2025-03-31
Payer: COMMERCIAL

## 2025-03-31 NOTE — TELEPHONE ENCOUNTER
Patient reports that she is still having the abdominal pain even though she had her procedure. She reports that she is no longer having constipation. She wanted to come in and discuss it with Dr. Streeter. Patient was given appointment date and time and verbalized understanding.

## 2025-03-31 NOTE — TELEPHONE ENCOUNTER
----- Message from Jeanne sent at 3/31/2025  1:38 PM CDT -----  Contact: Self  Joselo Schaefer SilvanoMRN: [id number] Phone      Not on file.Work Phone      Not on file.Mobile          [phone] Care Team:Keysha Arana FNP as PCP - General (Internal Medicine)Tatianna Wu MD as Consulting Physician (Obstetrics and Gynecology)[facility] phone number can you be reached at? [phone]: pt states she had surgery in December but is still having lower abdominal pain

## 2025-04-01 ENCOUNTER — OFFICE VISIT (OUTPATIENT)
Dept: OBSTETRICS AND GYNECOLOGY | Facility: CLINIC | Age: 40
End: 2025-04-01
Payer: COMMERCIAL

## 2025-04-01 VITALS
BODY MASS INDEX: 21.79 KG/M2 | WEIGHT: 123 LBS | HEART RATE: 75 BPM | HEIGHT: 63 IN | DIASTOLIC BLOOD PRESSURE: 80 MMHG | SYSTOLIC BLOOD PRESSURE: 134 MMHG

## 2025-04-01 DIAGNOSIS — R10.2 CHRONIC PELVIC PAIN IN FEMALE: ICD-10-CM

## 2025-04-01 DIAGNOSIS — M62.89 PELVIC FLOOR DYSFUNCTION IN FEMALE: Primary | ICD-10-CM

## 2025-04-01 DIAGNOSIS — R39.9 UTI SYMPTOMS: ICD-10-CM

## 2025-04-01 DIAGNOSIS — G89.29 CHRONIC PELVIC PAIN IN FEMALE: ICD-10-CM

## 2025-04-01 LAB
BILIRUB SERPL-MCNC: NEGATIVE MG/DL
BLOOD URINE, POC: NORMAL
CLARITY, POC UA: CLEAR
COLOR, POC UA: YELLOW
GLUCOSE UR QL STRIP: NEGATIVE
KETONES UR QL STRIP: NEGATIVE
LEUKOCYTE ESTERASE URINE, POC: NEGATIVE
NITRITE, POC UA: NEGATIVE
PH, POC UA: 6
PROTEIN, POC: NEGATIVE
SPECIFIC GRAVITY, POC UA: <=1.005
UROBILINOGEN, POC UA: NORMAL

## 2025-04-01 PROCEDURE — 99999 PR PBB SHADOW E&M-EST. PATIENT-LVL III: CPT | Mod: PBBFAC,,, | Performed by: OBSTETRICS & GYNECOLOGY

## 2025-04-01 PROCEDURE — 3079F DIAST BP 80-89 MM HG: CPT | Mod: CPTII,S$GLB,, | Performed by: OBSTETRICS & GYNECOLOGY

## 2025-04-01 PROCEDURE — 3075F SYST BP GE 130 - 139MM HG: CPT | Mod: CPTII,S$GLB,, | Performed by: OBSTETRICS & GYNECOLOGY

## 2025-04-01 PROCEDURE — 99213 OFFICE O/P EST LOW 20 MIN: CPT | Mod: S$GLB,,, | Performed by: OBSTETRICS & GYNECOLOGY

## 2025-04-01 PROCEDURE — 81002 URINALYSIS NONAUTO W/O SCOPE: CPT | Mod: S$GLB,,, | Performed by: OBSTETRICS & GYNECOLOGY

## 2025-04-01 PROCEDURE — 3008F BODY MASS INDEX DOCD: CPT | Mod: CPTII,S$GLB,, | Performed by: OBSTETRICS & GYNECOLOGY

## 2025-04-01 PROCEDURE — 1159F MED LIST DOCD IN RCRD: CPT | Mod: CPTII,S$GLB,, | Performed by: OBSTETRICS & GYNECOLOGY

## 2025-04-01 NOTE — PROGRESS NOTES
"  Subjective:    Patient ID: Joselo Schaefer is a 40 y.o. y.o. female.     Chief Complaint:   Chief Complaint   Patient presents with    Abdominal Pain       History of Present Illness:  Joselo presents today for a follow up. She was seen on 3/18. Per note "complaining of chronic abdominal pain since her hsyterectomy  on 12/13. She was seen in the ED on 12/17 with fever and abdominal pain. She reports she was followed up by her GYN and treated for an infection. She has been seen by GI on 3/13 for the constipation, bloating and abdominal pain. CT scan done from ED visit on 12/17 revealed an ileus"     Abdominal Xray 3/11/25   Moderate constipation    CT 3/14/25  Prior cholecystectomy and hysterectomy.  Unremarkable liver, kidneys, adrenals, spleen and pancreas.  No enlarged lymph nodes.  No dilated bowel.  Unremarkable appendix.  No free air, unexpected fluid collections or focal inflammatory changes.  As before, dystrophic left hepatic lobe calcifications.  No pathologically enlarged lymph nodes.    She has done a bowel prep with no relief of her pain. She reports her HRT is helping with her moods and hot flashes.     Reports pain is present with urinating and movement. Pain has been present since prior to hysterectomy.            ROS:   Review of Systems   Constitutional:  Negative for activity change, appetite change, chills, diaphoresis, fatigue, fever and unexpected weight change.   HENT:  Negative for mouth sores and tinnitus.    Eyes:  Negative for discharge and visual disturbance.   Respiratory:  Negative for cough, shortness of breath and wheezing.    Cardiovascular:  Negative for chest pain, palpitations and leg swelling.   Gastrointestinal:  Positive for abdominal pain and constipation. Negative for bloating, blood in stool, diarrhea, nausea and vomiting.   Endocrine: Negative for diabetes, hair loss, hot flashes, hyperthyroidism and hypothyroidism.   Genitourinary:  Positive for dyspareunia and " pelvic pain. Negative for dysuria, flank pain, frequency, genital sores, hematuria, urgency, vaginal bleeding, vaginal discharge, vaginal pain, postcoital bleeding and vaginal odor.   Musculoskeletal:  Negative for back pain, joint swelling and myalgias.   Integumentary:  Negative for rash, acne, breast mass, nipple discharge and breast skin changes.   Neurological:  Positive for headaches. Negative for seizures, syncope and numbness.   Hematological:  Negative for adenopathy. Does not bruise/bleed easily.   Psychiatric/Behavioral:  Negative for depression and sleep disturbance. The patient is not nervous/anxious.    Breast: Negative for mass, mastodynia, nipple discharge and skin changes          Objective:    Vital Signs:  Vitals:    04/01/25 1540   BP: 134/80   Pulse: 75       Physical Exam:  General:  alert, cooperative, no distress   Head Normocephalic, without obvious abnormality, atraumatic   Neck .supple, symmetrical, trachea midline   Skin:  Skin color, texture, turgor normal. No rashes or lesions   Abdomen:  soft, non-tender; bowel sounds normal   Pelvis: Deferred   Extremities extremities normal, atraumatic, no cyanosis or edema   Neurologic Grossly normal        Pelvic floor dysfunction in female  -     US Pelvis Comp with Transvag NON-OB (xpd; Future; Expected date: 04/01/2025  -     Ambulatory Referral/Consult to Physical Therapy; Future; Expected date: 04/08/2025    UTI symptoms  -     POCT URINE DIPSTICK WITHOUT MICROSCOPE    Chronic pelvic pain in female  -     US Pelvis Comp with Transvag NON-OB (xpd; Future; Expected date: 04/01/2025  -     Ambulatory Referral/Consult to Physical Therapy; Future; Expected date: 04/08/2025

## 2025-04-07 ENCOUNTER — PATIENT MESSAGE (OUTPATIENT)
Dept: OBSTETRICS AND GYNECOLOGY | Facility: CLINIC | Age: 40
End: 2025-04-07
Payer: COMMERCIAL

## 2025-04-10 DIAGNOSIS — Z79.890 HORMONE REPLACEMENT THERAPY (HRT): ICD-10-CM

## 2025-04-10 DIAGNOSIS — N95.1 PERIMENOPAUSAL VASOMOTOR SYMPTOMS: ICD-10-CM

## 2025-04-10 RX ORDER — ESTRADIOL 0.5 MG/1
0.5 TABLET ORAL DAILY
Qty: 30 TABLET | Refills: 0 | Status: SHIPPED | OUTPATIENT
Start: 2025-04-10 | End: 2026-04-10

## 2025-04-10 NOTE — TELEPHONE ENCOUNTER
Refill Routing Note   Medication(s) are not appropriate for processing by [facility] for the following reason(s):        New or recently adjusted medication    [facility] action(s):  Defer               Appointments  past 12m or future 3m with PCP    Date Provider   Last Visit   4/1/2025 Tatianna Wu MD   Next Visit   Visit date not found Tatianna Wu MD   ED visits in past 90 days: 0        Note composed:1:12 PM 04/10/2025

## 2025-04-11 ENCOUNTER — HOSPITAL ENCOUNTER (OUTPATIENT)
Dept: RADIOLOGY | Facility: HOSPITAL | Age: 40
Discharge: HOME | End: 2025-04-11
Attending: OBSTETRICS & GYNECOLOGY
Payer: COMMERCIAL

## 2025-04-11 DIAGNOSIS — R10.2 CHRONIC PELVIC PAIN IN FEMALE: ICD-10-CM

## 2025-04-11 DIAGNOSIS — G89.29 CHRONIC PELVIC PAIN IN FEMALE: ICD-10-CM

## 2025-04-11 DIAGNOSIS — M62.89 PELVIC FLOOR DYSFUNCTION IN FEMALE: ICD-10-CM

## 2025-04-11 PROCEDURE — 76856 US EXAM PELVIC COMPLETE: CPT | Mod: 26,,, | Performed by: RADIOLOGY

## 2025-04-11 PROCEDURE — 76830 TRANSVAGINAL US NON-OB: CPT | Mod: 26,,, | Performed by: RADIOLOGY

## 2025-04-11 PROCEDURE — 76856 US EXAM PELVIC COMPLETE: CPT | Mod: TC

## 2025-04-15 ENCOUNTER — PATIENT MESSAGE (OUTPATIENT)
Dept: OBSTETRICS AND GYNECOLOGY | Facility: CLINIC | Age: 40
End: 2025-04-15
Payer: COMMERCIAL

## 2025-04-15 DIAGNOSIS — Z79.890 HORMONE REPLACEMENT THERAPY (HRT): ICD-10-CM

## 2025-04-15 DIAGNOSIS — N95.1 PERIMENOPAUSAL VASOMOTOR SYMPTOMS: ICD-10-CM

## 2025-04-16 ENCOUNTER — CLINICAL SUPPORT (OUTPATIENT)
Dept: REHABILITATION | Facility: HOSPITAL | Age: 40
End: 2025-04-16
Payer: COMMERCIAL

## 2025-04-16 DIAGNOSIS — M62.89 PELVIC FLOOR DYSFUNCTION IN FEMALE: ICD-10-CM

## 2025-04-16 DIAGNOSIS — G89.29 CHRONIC PELVIC PAIN IN FEMALE: ICD-10-CM

## 2025-04-16 DIAGNOSIS — R10.2 CHRONIC PELVIC PAIN IN FEMALE: ICD-10-CM

## 2025-04-16 DIAGNOSIS — R27.8 COORDINATION ABNORMAL: Primary | ICD-10-CM

## 2025-04-16 PROCEDURE — 97162 PT EVAL MOD COMPLEX 30 MIN: CPT | Mod: PN | Performed by: PHYSICAL THERAPIST

## 2025-04-16 PROCEDURE — 97530 THERAPEUTIC ACTIVITIES: CPT | Mod: PN | Performed by: PHYSICAL THERAPIST

## 2025-04-16 NOTE — PATIENT INSTRUCTIONS
DIAPHRAGMATIC BREATHING     The diaphragm is a dome shaped muscle that forms the floor of the rib cage. It is the most efficient muscle for breathing and relaxation, although most people are not used to using the diaphragm. Diaphragmatic or belly breathing is an important technique to learn because it helps settle down or relax the autonomic nervous system. The correct use of diaphragmatic breathing can help to quiet brain activity resulting in the relaxation of all the muscles and organs of the body. This is accomplished by slow rhythmic breathing concentrated in the diaphragm muscle rather than the chest.    How to do proper relaxation breathing:    Start by lying on your back or reclining in a chair in a relaxed position. Place one hand on your chest and the other on your abdomen.  Relax your jaw by placing your tongue on the floor of your mouth and keeping your teeth slightly apart.   Take a deep breath in, letting the abdomen expand and rise while you keep your upper chest, neck and shoulders relaxed.   As you breathe out, allow your abdomen and chest to fall. Exhale completely.  It doesn't matter if you breathe in/out through your nose and/or mouth. Do whichever feels comfortable.  Remember to breathe slowly.  Do not force your breathing. Do not hold your breath.  Repeat for 5 minutes every day.          Place 2 fingers just outside of your vagina and feel what your muscles are doing.

## 2025-04-16 NOTE — PROGRESS NOTES
Outpatient Rehab    Physical Therapy Evaluation    Patient Name: Joselo Schaefer  MRN: [id number]  YOB: 1985  Encounter Date: 4/16/2025    Therapy Diagnosis:   Encounter Diagnoses   Name Primary?    Pelvic floor dysfunction in female     Chronic pelvic pain in female     Coordination abnormal Yes     Physician: Tatianna Wu,*    Physician Orders: Eval and Treat  Medical Diagnosis: Pelvic floor dysfunction in female  Chronic pelvic pain in female    Visit # 1/12 Visits Authorized:  1 / 1  Insurance Authorization Period: 4/1/2025 to 4/1/2026  Date of Evaluation: 4/16/2025  Plan of Care Certification: 4/16/2025 to 7/9/2025     Time In: 1519   Time Out: 1613  Total Time: 54   Total Billable Time:  54    Intake Outcome Measure for FOTO Survey    Therapist reviewed FOTO scores for Joselo Schaefer on 4/16/2025.   FOTO report - see Media section or FOTO account episode details.     Intake Score:  %         Subjective   History of Present Illness  Joselo is a 40 y.o. female who reports to physical therapy with a chief concern of M62.89 (ICD-10-CM) - Pelvic floor dysfunction in female  R10.2,G89.29 (ICD-10-CM) - Chronic pelvic pain in female.     The patient reports a medical diagnosis of M62.89 (ICD-10-CM) - Pelvic floor dysfunction in female  R10.2,G89.29 (ICD-10-CM) - Chronic pelvic pain in female. The patient has experienced this issue since 12/13/24.           History of Present Condition/Illness: Patient states that everything hurts. She had a hysterectomy in December 2024 with Dr. Mcdonald. She had pelvic pain and endometrosis. She does have 1 ovary remaining. States she had a lot of complications. She was given estrogen and vaginal cream. Has not been using her vaginal estrogen cream. She was released for full activities on March 17th, 2025. She sought out a second opinion with Dr. Streeter recently. States that her pain is worse since her surgery. She has been dealing with  pelvic pain for a few days to a week at a time. Pain started greater than 13 years ago.     Pain     Patient reports a current pain level of 10/10. Pain at best is reported as 10/10. Pain at worst is reported as 10/10.   Location: lower abdominal/bladder to the sacrum  Clinical Progression (since onset): Stable         Bladder/Bowel Habits and Symptoms  Bladder Habits  Urine Stream: Weak  Bladder Emptying: Incomplete  Frequency of Bladder Urgency: Frequently  Urinary Urge/Sensation: Sudden  Ability to Delay Urination: Less than 1 minute  Daytime Frequency of Urination: every 50 min  Nighttime Frequency of Urination: 1x  Estimated Fluid Intake: Water, coffee    Urinary Symptoms  Urine Leakage Amount: None    Bowel Habits  [place] Stool Form Scale: 4  Frequency of Bowel Movements: 1 time per day  Frequency of Bowel Urgency: Frequently  Bowel Straining/Pushing: Never  Bowel Incomplete Emptying: Frequently  Abdominal Fullness/Bloating: Always  Pain with Bowel Movements: Never  Constipation: Frequently  Attempts to Manage Constipation: Fiber, Laxative - osmotic    Bowel Symptoms  Bowel Incontinence Issues: None        Sexual Function  Sexually Active: Yes  Sexual Partners: Male  Pain with Sexual Function: Unable to achieve penetration  Vaginal Dryness: Yes  Lubricant Use: Silicone-based    Living Arrangements  Living Situation  Living Arrangements: Spouse/significant other        Employment  Patient reports: Does the patient's condition impact their ability to work?  Employment Status: Employed full-time   She teaches at [employer]       Past Medical History/Physical Systems Review:   Joselo Schaefer  has a past medical history of Endometriosis of uterus, Fibromyalgia, and Hormone disorder.    Joselo Schaefer  has a past surgical history that includes  section; Tubal ligation (); Laparoscopic cholecystectomy; Eye surgery (); Cholecystectomy (); hysterectomy, total, laparoscopic, with  salpingectomy (Bilateral, 12/13/2024); Surgical removal of endometriosis (N/A, 12/13/2024); Laparoscopic oophorectomy (Right, 12/13/2024); Cystoscopy with hydrodistension of bladder (N/A, 12/13/2024); Dilation of urethra (N/A, 12/13/2024); and Hysterectomy (Dec 13).    Joselo has a current medication list which includes the following prescription(s): b complex vitamins, buspirone, estradiol, and vitamin d.    Review of patient's allergies indicates:   Allergen Reactions    Diflucan [fluconazole] Other (See Comments)     Spots on face        Objective   Pelvic External Observations  Consent for Examination: Yes, Chaperone Present: No    Pelvic Assessment  Perineal Skin Quality: Unremarkable  Perineal Body Resting Position: Elevated  Perineal Descent Bearing Down: Absent  Volitional Contraction: Present  Volitional Relaxation: Absent          Pelvic Floor Palpation       Right External Pelvic Floor and Rectal Palpation  Abnormal: Layer 1 and Layer 3  Right Pelvic  1 External Palpation Observations: hypertonic  Right Pelvic  3 External Palpation Observations: hypertonic       Left External Pelvic Floor and Rectal Palpation  Abnormal: Layer 1 and Layer 3  Left Pelvic  1 External Palpation Observations: hypertonic  Left Pelvic  3 External Palpation Observations: hypertonic                           Pelvic Floor Special Tests  Single Digit Intravaginal Assessment  Intravaginal Pelvic Floor Strength: 1  Intravaginal Pelvic Floor Relaxation Response: Absent  Right Vaginal Sensation: Intact, Hypersensitive  Left Vaginal Sensation: Intact, Hypersensitive  [place] Swab Test: positive q-tip test             Treatment:  Therapeutic Activity  TA 1: PF down training  TA 2: Diaphragmatic breathing.    Time Entry(in minutes):  PT Evaluation (Moderate) Time Entry: 45  Therapeutic Activity Time Entry: 9    Assessment & Plan   Assessment  Joselo presents with a condition of Moderate  complexity.   Presentation of Symptoms: Stable  Will Comorbidities Impact Care: Yes  Long history of pelvic pain    Functional Limitations: Activity tolerance, Completing self-care activities, Functional mobility, Gross motor coordination, Pain with ADLs/IADLs, Participating in leisure activities, Proprioception, Sexual function, Driving, Disrupted sleep pattern  Impairments: Abnormal coordination, Lack of appropriate home exercise program, Abnormal muscle tone, Abnormal muscle firing, Activity intolerance  Personal Factors Affecting Prognosis: Fear/anxiety, Emotional, Physical limitations, Pain    Patient Goal for Therapy (PT): To improve pelvic pain  Prognosis: Good    Plan  From a physical therapy perspective, the patient would benefit from: Skilled Rehab Services    Planned therapy interventions include: Therapeutic exercise, Therapeutic activities, Neuromuscular re-education, Manual therapy, and Gait training.    Planned modalities to include: Biofeedback, Electrical stimulation - passive/unattended, Ultrasound, Dior/anal/urethral biofeedback, and Electrical stimulation - attended.        Visit Frequency: 1 times Per Week for 12 Weeks.       This plan was discussed with Patient.   Discussion participants: Agreed Upon Plan of Care  Plan details: Physical therapist treatment to include education and awareness of PF coordination, improving core strength, endurance and awareness, and improving QoL.           Patient's spiritual, cultural, and educational needs considered and patient agreeable to plan of care and goals.           Goals:   Active       Long term goals:       Patient will deny pelvic pain.        Start:  04/16/25    Expected End:  07/09/25            Patient will deny constipation.        Start:  04/16/25    Expected End:  07/09/25            Patient will deny pain with vaginal penetration.        Start:  04/16/25    Expected End:  07/09/25               Short term goals:       Patient will be  independent with pelvic floor mobility.        Start:  04/16/25    Expected End:  07/09/25            Patient will report pelvic pain of 7/10 at its worst.        Start:  04/16/25    Expected End:  07/09/25            Patient will be independent with use of vaginal dilators.        Start:  04/16/25    Expected End:  07/09/25                Alvin Garcia, PT

## 2025-04-17 NOTE — TELEPHONE ENCOUNTER
Pt lost applicator for estradiol cream. Contacted [facility] and they are unable to just give her an applicator. Rx was recently filled per pharmacy. Pt needing new rx. Unable to find on patient medication list to pend.

## 2025-04-21 ENCOUNTER — PATIENT MESSAGE (OUTPATIENT)
Dept: REHABILITATION | Facility: HOSPITAL | Age: 40
End: 2025-04-21
Payer: COMMERCIAL

## 2025-04-21 DIAGNOSIS — M62.89 PELVIC FLOOR DYSFUNCTION IN FEMALE: Primary | ICD-10-CM

## 2025-04-21 DIAGNOSIS — R10.2 CHRONIC PELVIC PAIN IN FEMALE: ICD-10-CM

## 2025-04-21 DIAGNOSIS — G89.29 CHRONIC PELVIC PAIN IN FEMALE: ICD-10-CM

## 2025-04-21 DIAGNOSIS — M62.89 PELVIC FLOOR DYSFUNCTION IN FEMALE: ICD-10-CM

## 2025-04-22 ENCOUNTER — TELEPHONE (OUTPATIENT)
Dept: OBSTETRICS AND GYNECOLOGY | Facility: CLINIC | Age: 40
End: 2025-04-22
Payer: COMMERCIAL

## 2025-04-22 NOTE — TELEPHONE ENCOUNTER
----- Message from Med Assistant Bernal sent at 4/22/2025 11:26 AM CDT -----  Contact: Lorraine castillo/[facility]  Joselo SchaeferMRN: [id number] Phone      Not on file.Work Phone      Not on file.Mobile          [phone] Care Team:Keysha Arana FNP as PCP - General (Internal Medicine)Tatianna Wu MD as Consulting Physician (Obstetrics and Gynecology)OB? NoWt phone number can you be reached at? [phone]: Following up on Rx that was to be sent in.  Stated patient is telling pharmacy that we are having a hard time faxing Rx to them.

## 2025-04-23 ENCOUNTER — CLINICAL SUPPORT (OUTPATIENT)
Dept: REHABILITATION | Facility: HOSPITAL | Age: 40
End: 2025-04-23
Payer: COMMERCIAL

## 2025-04-23 DIAGNOSIS — R27.8 COORDINATION ABNORMAL: ICD-10-CM

## 2025-04-23 DIAGNOSIS — G89.29 CHRONIC PELVIC PAIN IN FEMALE: ICD-10-CM

## 2025-04-23 DIAGNOSIS — M62.89 PELVIC FLOOR DYSFUNCTION IN FEMALE: Primary | ICD-10-CM

## 2025-04-23 DIAGNOSIS — R10.2 CHRONIC PELVIC PAIN IN FEMALE: ICD-10-CM

## 2025-04-23 PROCEDURE — 97112 NEUROMUSCULAR REEDUCATION: CPT | Mod: PN | Performed by: PHYSICAL THERAPIST

## 2025-04-23 PROCEDURE — 97530 THERAPEUTIC ACTIVITIES: CPT | Mod: PN | Performed by: PHYSICAL THERAPIST

## 2025-04-23 NOTE — PROGRESS NOTES
Outpatient Rehab    Physical Therapy Visit    Patient Name: Joselo Schaefer  MRN: [id number]  YOB: 1985  Encounter Date: 4/23/2025    Therapy Diagnosis:   Encounter Diagnoses   Name Primary?    Pelvic floor dysfunction in female Yes    Chronic pelvic pain in female     Coordination abnormal      Physician: Tatianna Wu,*    Physician Orders: Eval and Treat  Medical Diagnosis: Pelvic floor dysfunction in female  Chronic pelvic pain in female    Visit # / Visits Authorized:  1 / 12  Insurance Authorization Period: 4/15/2025 to 12/31/2025  Date of Evaluation: 4/16/2025   Plan of Care Certification: 4/16/2025 to 7/9/2025      PT/PTA:     Number of PTA visits since last PT visit:   Time In: 1514   Time Out: 1605  Total Time: 51   Total Billable Time:  51    FOTO:  Intake Score:  %  Survey Score 1:  %  Survey Score 2:  %         Subjective   Did a little of the breathing when she was in pain. It did help. Trying to breathe and release when using the bathroom. Did have a lot of pain after our last visit for 2 days..  Pain reported as 6/10.      Objective            Treatment:  Balance/Neuromuscular Re-Education  NMR 1: Perineal RUSI with limited pf mobility and contraction  Therapeutic Activity  TA 2: Diaphragmatic breathing.  TA 3: Educated on benefits of pelvic wand and vaginal dilators.  TA 4: Educated on use of vaginal suppositories.    Time Entry(in minutes):  Neuromuscular Re-Education Time Entry: 30  Therapeutic Activity Time Entry: 21    Assessment & Plan   Assessment: No change in pain after PT session       Patient will continue to benefit from skilled outpatient physical therapy to address the deficits listed in the problem list box on initial evaluation, provide pt/family education and to maximize pt's level of independence in the home and community environment.     Patient's spiritual, cultural, and educational needs considered and patient agreeable to plan of care and goals.            Plan: Progressive PF mobility as pt tolerates. Had pain after pelvic exam. Provide LE ROM HEP.    Goals:   Active       Long term goals:       Patient will deny pelvic pain.        Start:  04/16/25    Expected End:  07/09/25            Patient will deny constipation.        Start:  04/16/25    Expected End:  07/09/25            Patient will deny pain with vaginal penetration.        Start:  04/16/25    Expected End:  07/09/25               Short term goals:       Patient will be independent with pelvic floor mobility.        Start:  04/16/25    Expected End:  07/09/25            Patient will report pelvic pain of 7/10 at its worst.        Start:  04/16/25    Expected End:  07/09/25            Patient will be independent with use of vaginal dilators.        Start:  04/16/25    Expected End:  07/09/25                Alvin Garcia, PT

## 2025-04-30 ENCOUNTER — CLINICAL SUPPORT (OUTPATIENT)
Dept: REHABILITATION | Facility: HOSPITAL | Age: 40
End: 2025-04-30
Payer: COMMERCIAL

## 2025-04-30 DIAGNOSIS — R27.8 COORDINATION ABNORMAL: ICD-10-CM

## 2025-04-30 DIAGNOSIS — G89.29 CHRONIC PELVIC PAIN IN FEMALE: ICD-10-CM

## 2025-04-30 DIAGNOSIS — M62.89 PELVIC FLOOR DYSFUNCTION IN FEMALE: Primary | ICD-10-CM

## 2025-04-30 DIAGNOSIS — R10.2 CHRONIC PELVIC PAIN IN FEMALE: ICD-10-CM

## 2025-04-30 PROCEDURE — 97530 THERAPEUTIC ACTIVITIES: CPT | Mod: PN | Performed by: PHYSICAL THERAPIST

## 2025-04-30 PROCEDURE — 97140 MANUAL THERAPY 1/> REGIONS: CPT | Mod: PN | Performed by: PHYSICAL THERAPIST

## 2025-04-30 PROCEDURE — 97112 NEUROMUSCULAR REEDUCATION: CPT | Mod: PN | Performed by: PHYSICAL THERAPIST

## 2025-04-30 NOTE — PATIENT INSTRUCTIONS
Cardio - gentle walking do no incline the treadmill  - think glut activation (your butt)     Do not tolerate or cause pain with exercise    Yoga is great

## 2025-05-01 ENCOUNTER — PATIENT MESSAGE (OUTPATIENT)
Dept: REHABILITATION | Facility: HOSPITAL | Age: 40
End: 2025-05-01
Payer: COMMERCIAL

## 2025-05-01 NOTE — PROGRESS NOTES
Outpatient Rehab    Physical Therapy Visit    Patient Name: Joselo Schaefer  MRN: [id number]  YOB: 1985  Encounter Date: 4/30/2025    Therapy Diagnosis:   Encounter Diagnoses   Name Primary?    Pelvic floor dysfunction in female Yes    Chronic pelvic pain in female     Coordination abnormal      Physician: Tatianna Wu,*    Physician Orders: Eval and Treat  Medical Diagnosis: Pelvic floor dysfunction in female  Chronic pelvic pain in female    Visit # 4/12 Visits Authorized:  2 / 12  Insurance Authorization Period: 4/15/2025 to 12/31/2025  Date of Evaluation: 4/16/2025   Plan of Care Certification: 4/16/2025 to 7/9/2025      PT/PTA:     Number of PTA visits since last PT visit:   Time In: 1602   Time Out: 1647  Total Time: 45   Total Billable Time:  45    FOTO:  Intake Score:  %  Survey Score 1:  %  Survey Score 2:  %         Subjective   Has pain today. Was walking alot. Did not rest much today. Used first suppository last night - not sure if it helped because she went to sleep. Had a good bowel movement this morning. She is very bloated..  Pain reported as 6/10.      Objective            Treatment:  Manual Therapy  MT 1: External BC approximation  MT 2: TP release to odin BC to tolerance  MT 3: Educated pt on home BC TP release  Balance/Neuromuscular Re-Education  NMR 1: Perineal RUSI with slightly improved pf mobility and mildly improved pf contraction  NMR 2: Re-educated on breathing mechanics with pf down training  Therapeutic Activity  TA 4: Educated on use of vaginal suppositories.  TA 5: Provided with LE ROM HEP.    Time Entry(in minutes):  Manual Therapy Time Entry: 15  Neuromuscular Re-Education Time Entry: 20  Therapeutic Activity Time Entry: 10    Assessment & Plan   Assessment: Pain was 4/10 after PT session which is significant progress for her.       Patient will continue to benefit from skilled outpatient physical therapy to address the deficits listed in the problem  list box on initial evaluation, provide pt/family education and to maximize pt's level of independence in the home and community environment.     Patient's spiritual, cultural, and educational needs considered and patient agreeable to plan of care and goals.           Plan: Progressive PF mobility as pt tolerates. Had pain after pelvic exam. Monitor LE ROM HEP.    Goals:   Active       Long term goals:       Patient will deny pelvic pain.        Start:  04/16/25    Expected End:  07/09/25            Patient will deny constipation.        Start:  04/16/25    Expected End:  07/09/25            Patient will deny pain with vaginal penetration.        Start:  04/16/25    Expected End:  07/09/25               Short term goals:       Patient will be independent with pelvic floor mobility.        Start:  04/16/25    Expected End:  07/09/25            Patient will report pelvic pain of 7/10 at its worst.        Start:  04/16/25    Expected End:  07/09/25            Patient will be independent with use of vaginal dilators.        Start:  04/16/25    Expected End:  07/09/25                Alvin Garcia, PT

## 2025-05-02 ENCOUNTER — PATIENT MESSAGE (OUTPATIENT)
Dept: REHABILITATION | Facility: HOSPITAL | Age: 40
End: 2025-05-02
Payer: COMMERCIAL

## 2025-05-07 ENCOUNTER — LAB VISIT (OUTPATIENT)
Dept: LAB | Facility: HOSPITAL | Age: 40
End: 2025-05-07
Attending: OBSTETRICS & GYNECOLOGY
Payer: COMMERCIAL

## 2025-05-07 ENCOUNTER — CLINICAL SUPPORT (OUTPATIENT)
Dept: REHABILITATION | Facility: HOSPITAL | Age: 40
End: 2025-05-07
Payer: COMMERCIAL

## 2025-05-07 ENCOUNTER — OFFICE VISIT (OUTPATIENT)
Dept: OBSTETRICS AND GYNECOLOGY | Facility: CLINIC | Age: 40
End: 2025-05-07
Payer: COMMERCIAL

## 2025-05-07 VITALS
BODY MASS INDEX: 22.61 KG/M2 | WEIGHT: 127.63 LBS | SYSTOLIC BLOOD PRESSURE: 132 MMHG | HEIGHT: 63 IN | HEART RATE: 77 BPM | DIASTOLIC BLOOD PRESSURE: 82 MMHG

## 2025-05-07 DIAGNOSIS — N95.1 PERIMENOPAUSAL VASOMOTOR SYMPTOMS: ICD-10-CM

## 2025-05-07 DIAGNOSIS — R27.8 COORDINATION ABNORMAL: ICD-10-CM

## 2025-05-07 DIAGNOSIS — G89.29 CHRONIC PELVIC PAIN IN FEMALE: ICD-10-CM

## 2025-05-07 DIAGNOSIS — Z79.890 HORMONE REPLACEMENT THERAPY (HRT): ICD-10-CM

## 2025-05-07 DIAGNOSIS — R10.2 CHRONIC PELVIC PAIN IN FEMALE: ICD-10-CM

## 2025-05-07 DIAGNOSIS — R39.9 UTI SYMPTOMS: Primary | ICD-10-CM

## 2025-05-07 DIAGNOSIS — M62.89 PELVIC FLOOR DYSFUNCTION IN FEMALE: Primary | ICD-10-CM

## 2025-05-07 LAB
BILIRUB SERPL-MCNC: NEGATIVE MG/DL
BLOOD URINE, POC: NORMAL
CLARITY, POC UA: CLEAR
COLOR, POC UA: YELLOW
ESTRADIOL SERPL HS-MCNC: 27 PG/ML
FSH SERPL-ACNC: 12.48 MIU/ML
GLUCOSE UR QL STRIP: NEGATIVE
KETONES UR QL STRIP: NEGATIVE
LEUKOCYTE ESTERASE URINE, POC: NEGATIVE
NITRITE, POC UA: NEGATIVE
PH, POC UA: 6.5
PROTEIN, POC: NEGATIVE
SPECIFIC GRAVITY, POC UA: 1.02
UROBILINOGEN, POC UA: NORMAL

## 2025-05-07 PROCEDURE — 97112 NEUROMUSCULAR REEDUCATION: CPT | Mod: PN | Performed by: PHYSICAL THERAPIST

## 2025-05-07 PROCEDURE — 3008F BODY MASS INDEX DOCD: CPT | Mod: CPTII,S$GLB,, | Performed by: OBSTETRICS & GYNECOLOGY

## 2025-05-07 PROCEDURE — 99213 OFFICE O/P EST LOW 20 MIN: CPT | Mod: S$GLB,,, | Performed by: OBSTETRICS & GYNECOLOGY

## 2025-05-07 PROCEDURE — 3079F DIAST BP 80-89 MM HG: CPT | Mod: CPTII,S$GLB,, | Performed by: OBSTETRICS & GYNECOLOGY

## 2025-05-07 PROCEDURE — 83001 ASSAY OF GONADOTROPIN (FSH): CPT

## 2025-05-07 PROCEDURE — 82670 ASSAY OF TOTAL ESTRADIOL: CPT

## 2025-05-07 PROCEDURE — 36415 COLL VENOUS BLD VENIPUNCTURE: CPT

## 2025-05-07 PROCEDURE — 81002 URINALYSIS NONAUTO W/O SCOPE: CPT | Mod: S$GLB,,, | Performed by: OBSTETRICS & GYNECOLOGY

## 2025-05-07 PROCEDURE — 99999 PR PBB SHADOW E&M-EST. PATIENT-LVL II: CPT | Mod: PBBFAC,,, | Performed by: OBSTETRICS & GYNECOLOGY

## 2025-05-07 PROCEDURE — 3075F SYST BP GE 130 - 139MM HG: CPT | Mod: CPTII,S$GLB,, | Performed by: OBSTETRICS & GYNECOLOGY

## 2025-05-07 RX ORDER — LACOSAMIDE 50 MG/1
50 TABLET ORAL 2 TIMES DAILY
COMMUNITY
Start: 2025-02-21

## 2025-05-07 RX ORDER — ESTRADIOL 1 MG/1
1 TABLET ORAL DAILY
Qty: 30 TABLET | Refills: 6 | Status: SHIPPED | OUTPATIENT
Start: 2025-05-07 | End: 2026-05-07

## 2025-05-07 RX ORDER — ALPRAZOLAM 0.25 MG/1
0.25 TABLET ORAL 2 TIMES DAILY PRN
COMMUNITY
Start: 2025-02-21

## 2025-05-07 NOTE — PROGRESS NOTES
Subjective:    Patient ID: Joselo Schaefer is a 40 y.o. y.o. female.     Chief Complaint:   Chief Complaint   Patient presents with    Follow-up       History of Present Illness:  Joselo presents today for a follow up. She was placed on estrogen but reports she is still having hot flashes and night sweats. Discussed increasing dosage. She is currently seeing PT for pelvic floor therapy; she needs a refill on her vaginal suppositories.       ROS:   Review of Systems   Constitutional:  Negative for activity change, appetite change, chills, diaphoresis, fatigue, fever and unexpected weight change.   HENT:  Negative for mouth sores and tinnitus.    Eyes:  Negative for discharge and visual disturbance.   Respiratory:  Negative for cough, shortness of breath and wheezing.    Cardiovascular:  Negative for chest pain, palpitations and leg swelling.   Gastrointestinal:  Negative for abdominal pain, bloating, blood in stool, constipation, diarrhea, nausea and vomiting.   Endocrine: Negative for diabetes, hair loss, hot flashes, hyperthyroidism and hypothyroidism.   Genitourinary:  Positive for dyspareunia and pelvic pain. Negative for dysuria, flank pain, frequency, genital sores, hematuria, urgency, vaginal bleeding, vaginal discharge, vaginal pain, postcoital bleeding and vaginal odor.   Musculoskeletal:  Negative for back pain, joint swelling and myalgias.   Integumentary:  Negative for rash, acne, breast mass, nipple discharge and breast skin changes.   Neurological:  Negative for seizures, syncope, numbness and headaches.   Hematological:  Negative for adenopathy. Does not bruise/bleed easily.   Psychiatric/Behavioral:  Negative for depression and sleep disturbance. The patient is not nervous/anxious.    Breast: Negative for mass, mastodynia, nipple discharge and skin changes          Objective:    Vital Signs:  Vitals:    05/07/25 1632   BP: 132/82   Pulse: 77       Physical Exam:  General:  alert,  cooperative, no distress   Head Normocephalic, without obvious abnormality, atraumatic   Neck .supple, symmetrical, trachea midline   Skin:  Skin color, texture, turgor normal. No rashes or lesions   Abdomen:  soft, non-tender; bowel sounds normal   Pelvis: Deferred   Extremities extremities normal, atraumatic, no cyanosis or edema   Neurologic Grossly normal        UTI symptoms  -     POCT URINE DIPSTICK WITHOUT MICROSCOPE    Perimenopausal vasomotor symptoms  -     ESTRADIOL; Future; Expected date: 05/07/2025  -     Follicle Stimulating Hormone; Future; Expected date: 05/07/2025    Hormone replacement therapy (HRT)  -     ESTRADIOL; Future; Expected date: 05/07/2025  -     Follicle Stimulating Hormone; Future; Expected date: 05/07/2025    Other orders  -     estradioL (ESTRACE) 1 MG tablet; Take 1 tablet (1 mg total) by mouth once daily.  Dispense: 30 tablet; Refill: 6

## 2025-05-07 NOTE — PROGRESS NOTES
Outpatient Rehab    Physical Therapy Visit    Patient Name: Joselo Schaefer  MRN: [id number]  YOB: 1985  Encounter Date: 5/7/2025    Therapy Diagnosis:   Encounter Diagnoses   Name Primary?    Pelvic floor dysfunction in female Yes    Chronic pelvic pain in female     Coordination abnormal      Physician: Tatianna Wu,*    Physician Orders: Eval and Treat  Medical Diagnosis: Pelvic floor dysfunction in female  Chronic pelvic pain in female    Visit # 5/12 Visits Authorized:  3 / 12  Insurance Authorization Period: 4/15/2025 to 12/31/2025  Date of Evaluation: 4/16/2025   Plan of Care Certification: 4/16/2025 to 7/9/2025      PT/PTA:     Number of PTA visits since last PT visit:   Time In: 1532   Time Out: 1619  Total Time: 47   Total Billable Time:  47    FOTO:  Intake Score:  %  Survey Score 1:  %  Survey Score 2:  %         Subjective   Has a lot of pain today. Had increased pain for 3 days after our last visit. Yesterday was ok, and pain is bad today. Using suppositories at night which helps, then pain returns in the morning. Bowels are moving well..  Pain reported as 10/10.      Objective            Treatment:  Balance/Neuromuscular Re-Education  NMR 1: Perineal RUSI with slightly improved pf mobility and awareness and much improved pf contraction  NMR 2: Re-educated on breathing mechanics with pf down training and use of PF mobility to assist with pain and bowel and bladder evacuation    Time Entry(in minutes):       Assessment & Plan   Assessment: No change in pain after PT session.       Patient will continue to benefit from skilled outpatient physical therapy to address the deficits listed in the problem list box on initial evaluation, provide pt/family education and to maximize pt's level of independence in the home and community environment.     Patient's spiritual, cultural, and educational needs considered and patient agreeable to plan of care and goals.            Plan: Pt seeing Dr. Streeter today. Will await further recs. Pt to use suppository prior to our next visit.    Goals:   Active       Long term goals:       Patient will deny pelvic pain.        Start:  04/16/25    Expected End:  07/09/25            Patient will deny constipation.        Start:  04/16/25    Expected End:  07/09/25            Patient will deny pain with vaginal penetration.        Start:  04/16/25    Expected End:  07/09/25               Short term goals:       Patient will be independent with pelvic floor mobility.        Start:  04/16/25    Expected End:  07/09/25            Patient will report pelvic pain of 7/10 at its worst.        Start:  04/16/25    Expected End:  07/09/25            Patient will be independent with use of vaginal dilators.        Start:  04/16/25    Expected End:  07/09/25                Alvin Garcia, PT

## 2025-05-08 ENCOUNTER — PATIENT MESSAGE (OUTPATIENT)
Dept: OBSTETRICS AND GYNECOLOGY | Facility: CLINIC | Age: 40
End: 2025-05-08
Payer: COMMERCIAL

## 2025-05-08 ENCOUNTER — RESULTS FOLLOW-UP (OUTPATIENT)
Dept: OBSTETRICS AND GYNECOLOGY | Facility: CLINIC | Age: 40
End: 2025-05-08

## 2025-05-08 DIAGNOSIS — R10.2 CHRONIC PELVIC PAIN IN FEMALE: ICD-10-CM

## 2025-05-08 DIAGNOSIS — M62.89 PELVIC FLOOR DYSFUNCTION IN FEMALE: ICD-10-CM

## 2025-05-08 DIAGNOSIS — G89.29 CHRONIC PELVIC PAIN IN FEMALE: ICD-10-CM

## 2025-05-08 NOTE — TELEPHONE ENCOUNTER
Next level is the book.     It was higher when you were on the patch and then you stopped that so now we are trying to titrate up again.

## 2025-05-14 ENCOUNTER — CLINICAL SUPPORT (OUTPATIENT)
Dept: REHABILITATION | Facility: HOSPITAL | Age: 40
End: 2025-05-14
Payer: COMMERCIAL

## 2025-05-14 DIAGNOSIS — M62.89 PELVIC FLOOR DYSFUNCTION IN FEMALE: Primary | ICD-10-CM

## 2025-05-14 DIAGNOSIS — G89.29 CHRONIC PELVIC PAIN IN FEMALE: ICD-10-CM

## 2025-05-14 DIAGNOSIS — R27.8 COORDINATION ABNORMAL: ICD-10-CM

## 2025-05-14 DIAGNOSIS — R10.2 CHRONIC PELVIC PAIN IN FEMALE: ICD-10-CM

## 2025-05-14 PROCEDURE — 97140 MANUAL THERAPY 1/> REGIONS: CPT | Mod: PN | Performed by: PHYSICAL THERAPIST

## 2025-05-14 PROCEDURE — 97530 THERAPEUTIC ACTIVITIES: CPT | Mod: PN | Performed by: PHYSICAL THERAPIST

## 2025-05-14 NOTE — PROGRESS NOTES
Outpatient Rehab    Physical Therapy Visit    Patient Name: Joselo Schaefer  MRN: [id number]  YOB: 1985  Encounter Date: 5/14/2025    Therapy Diagnosis:   Encounter Diagnoses   Name Primary?    Pelvic floor dysfunction in female Yes    Chronic pelvic pain in female     Coordination abnormal      Physician: Tatianna Wu,*    Physician Orders: Eval and Treat  Medical Diagnosis: Pelvic floor dysfunction in female  Chronic pelvic pain in female    Visit # 5/12 Visits Authorized:  4 / 12  Insurance Authorization Period: 4/15/2025 to 12/31/2025  Date of Evaluation: 4/16/2025   Plan of Care Certification: 4/16/2025 to 7/9/2025      PT/PTA:     Number of PTA visits since last PT visit:   Time In: 1522   Time Out: 1558  Total Time: 36   Total Billable Time:  47    FOTO:  Intake Score:  %  Survey Score 1:  %  Survey Score 2:  %         Subjective   Pt states that she did not have pain on Monday..         Objective            Treatment:  Manual Therapy  MT 1: External BC approximation  MT 2: TP release to odin BC to tolerance  MT 3: Educated pt on home BC TP release  Therapeutic Activity  TA 1: PF down training  TA 3: Educated on obstaining from intercourse if it is increasing her pelvic pain.  TA 4: Educated on use of vaginal suppositories.  TA 6: Educated on urogyn referral.    Time Entry(in minutes):       Assessment & Plan   Assessment: Improved pain after PT session today.       Patient will continue to benefit from skilled outpatient physical therapy to address the deficits listed in the problem list box on initial evaluation, provide pt/family education and to maximize pt's level of independence in the home and community environment.     Patient's spiritual, cultural, and educational needs considered and patient agreeable to plan of care and goals.           Plan: Progressive PF manual therapy and mobility.    Goals:   Active       Long term goals:       Patient will deny pelvic  pain.        Start:  04/16/25    Expected End:  07/09/25            Patient will deny constipation.        Start:  04/16/25    Expected End:  07/09/25            Patient will deny pain with vaginal penetration.        Start:  04/16/25    Expected End:  07/09/25               Short term goals:       Patient will be independent with pelvic floor mobility.        Start:  04/16/25    Expected End:  07/09/25            Patient will report pelvic pain of 7/10 at its worst.        Start:  04/16/25    Expected End:  07/09/25            Patient will be independent with use of vaginal dilators.        Start:  04/16/25    Expected End:  07/09/25                Alvin Garcia, PT

## 2025-05-15 ENCOUNTER — TELEPHONE (OUTPATIENT)
Dept: UROGYNECOLOGY | Facility: CLINIC | Age: 40
End: 2025-05-15
Payer: COMMERCIAL

## 2025-05-15 DIAGNOSIS — M62.89 PELVIC FLOOR DYSFUNCTION IN FEMALE: Primary | ICD-10-CM

## 2025-05-15 NOTE — TELEPHONE ENCOUNTER
----- Message from Nakita sent at 5/15/2025 11:14 AM CDT -----  Regarding: new pt appt  Name of Who is Calling:ptWhat is the request in detail:pt is a new pt looking to be scheduled as a new pt. She does have a referral from her dr. Please call to schedule. Can the clinic reply by [facility]:What Number to Call Back if not in [facility]: [phone]

## 2025-05-15 NOTE — TELEPHONE ENCOUNTER
Scheduled pt for new pt visit. States she is having urinary frequency (every 50 minutes) and pain in pelvis.

## 2025-05-16 ENCOUNTER — TELEPHONE (OUTPATIENT)
Dept: OBSTETRICS AND GYNECOLOGY | Facility: CLINIC | Age: 40
End: 2025-05-16
Payer: COMMERCIAL

## 2025-05-16 ENCOUNTER — TELEPHONE (OUTPATIENT)
Dept: UROGYNECOLOGY | Facility: CLINIC | Age: 40
End: 2025-05-16
Payer: COMMERCIAL

## 2025-05-16 ENCOUNTER — PATIENT MESSAGE (OUTPATIENT)
Dept: OBSTETRICS AND GYNECOLOGY | Facility: CLINIC | Age: 40
End: 2025-05-16
Payer: COMMERCIAL

## 2025-05-16 ENCOUNTER — E-VISIT (OUTPATIENT)
Dept: OBSTETRICS AND GYNECOLOGY | Facility: CLINIC | Age: 40
End: 2025-05-16
Payer: COMMERCIAL

## 2025-05-16 ENCOUNTER — PATIENT MESSAGE (OUTPATIENT)
Dept: REHABILITATION | Facility: HOSPITAL | Age: 40
End: 2025-05-16
Payer: COMMERCIAL

## 2025-05-16 DIAGNOSIS — B37.31 YEAST VAGINITIS: Primary | ICD-10-CM

## 2025-05-16 DIAGNOSIS — N89.8 VAGINAL DISCHARGE: Primary | ICD-10-CM

## 2025-05-16 PROCEDURE — 99421 OL DIG E/M SVC 5-10 MIN: CPT | Mod: ,,, | Performed by: OBSTETRICS & GYNECOLOGY

## 2025-05-16 NOTE — TELEPHONE ENCOUNTER
Attempted to schedule patient with Dr. Gann, next available in October. Patient decided to keep appointment with Kim.     ----- Message from Navin Diallo sent at 5/16/2025 10:39 AM CDT -----  Ted Atkins, Pt does not want to see Dr. Hill; however, she stated that she was already scheduled for 7/24 with a provider, but she wants Dr. Gann. Can pt get assist with being scheduled with Dr. Gann  ----- Message -----  From: Milly Laguna RN  Sent: 5/16/2025   8:07 AM CDT  To: Liz PENALOZA Staff    Hi! Can anyone assist this patient in scheduling for pelvic pain? Thank you!!Milly  ----- Message -----  From: Hayley Wells LPN  Sent: 5/15/2025   4:50 PM CDT  To: Milly Laguna RN    Is it Liz?  ----- Message -----  From: Milly Laguna RN  Sent: 5/14/2025   1:26 PM CDT  To: Hayley Wells LPN    Who sees patients for pelvic floor pain again?  ----- Message -----  From: Kayleen Parks  Sent: 5/14/2025  11:03 AM CDT  To: Sanjuanita CARTWRIGHT Staff    Name of Who is Calling:SIMON PORTILLO [[id number]]  What is the request in detail: Pt calling to schedule an apt for pelvic floor pain as a new patient.Please call back to further assist.   Can the clinic reply by [facility]: No  [facility]: [phone]

## 2025-05-16 NOTE — TELEPHONE ENCOUNTER
----- Message from DOLORES Atkins sent at 5/16/2025  8:06 AM CDT -----  Hi! Can anyone assist this patient in scheduling for pelvic pain? Thank you!!Milly  ----- Message -----  From: Hayley Wells LPN  Sent: 5/15/2025   4:50 PM CDT  To: Milly Laguna RN    Is it Gala?  ----- Message -----  From: Milly Laguna RN  Sent: 5/14/2025   1:26 PM CDT  To: Hayley Wells LPN    Who sees patients for pelvic floor pain again?  ----- Message -----  From: Kayleen Parks  Sent: 5/14/2025  11:03 AM CDT  To: Sanjuanita CARTWRIGHT Staff    Name of Who is Calling:SIMON PORTILLO [[id number]]  What is the request in detail: Pt calling to schedule an apt for pelvic floor pain as a new patient.Please call back to further assist.   Can the clinic reply by [facility]: No  [facility]: [phone]

## 2025-05-16 NOTE — TELEPHONE ENCOUNTER
Spoke with pt in regards to scheduling appt, pt states she's already scheduled with a different provider. All information forward to requesting DOLORES Laguna to confirm. Pt verbalized understanding.

## 2025-05-19 RX ORDER — TERCONAZOLE 4 MG/G
1 CREAM VAGINAL NIGHTLY
Qty: 45 G | Refills: 0 | Status: SHIPPED | OUTPATIENT
Start: 2025-05-19

## 2025-05-19 NOTE — PROGRESS NOTES
Patient ID: Joselo Schaefer is a 40 y.o. female.    Chief Complaint: Vaginal Discharge (Entered automatically based on patient selection in [employer].)    The patient initiated a request through [employer] on 5/16/2025 for evaluation and management with a chief complaint of Vaginal Discharge (Entered automatically based on patient selection in [employer].)     I evaluated the questionnaire submission on 5/16/25.    [facility] Peq Evisit Vaginal Concerns    5/16/2025  5:18 PM CDT - Filed by Patient   Do you agree to participate in an E-Visit? Yes   If you have any of the following symptoms,  please do not complete an E-Visit,  schedule an appointment with your provider: I acknowledge   Choose the state of your primary residence [place]   Do you have any of the following pregnancy-related conditions? None   What is the main issue you would like addressed today? Vulva itching and burning   Which of the following vaginal concerns do you have? Discharge;  Dryness;  Itching   Do you have vaginal discharge? Clear discharge   Do you have pain while passing urine? Yes   Do you have any of the following symptoms? Belly pain;  Frequent urination;  Lower back pain;  Pain during sex    Have you taken antibiotics in the last two weeks? No    Do you use any of the following? No   Which of the following applies to your menstrual period? Have not had for a while   Which of the following applies to your menstrual cycle? No bleeding   Do you have spotting between periods? No   Do you have pain with your period? No   Have you had similar symptoms in the past? Frequently   When you had similar symptoms in the past, did any of the following work? Not sure   Have you had a temperature of 100.4 or higher? No   Provide any additional information you feel is important. Is itching and i feek like the smell is like when you were ovulating sonething like that. I have low and back pain   Please attach any relevant images or files    Are you able  to take your vital signs? No         Encounter Diagnosis   Name Primary?    Yeast vaginitis Yes        No orders of the defined types were placed in this encounter.     Medications Ordered This Encounter   Medications    terconazole (TERAZOL 7) 0.4 % Crea     Sig: Place 1 applicator vaginally every evening.     Dispense:  45 g     Refill:  0        No follow-ups on file.      E-Visit Time Tracking:    Day 1 Time (in minutes): 5    Total Time (in minutes): 5

## 2025-05-27 ENCOUNTER — CLINICAL SUPPORT (OUTPATIENT)
Dept: REHABILITATION | Facility: HOSPITAL | Age: 40
End: 2025-05-27
Payer: COMMERCIAL

## 2025-05-27 DIAGNOSIS — M62.89 PELVIC FLOOR DYSFUNCTION IN FEMALE: Primary | ICD-10-CM

## 2025-05-27 DIAGNOSIS — G89.29 CHRONIC PELVIC PAIN IN FEMALE: ICD-10-CM

## 2025-05-27 DIAGNOSIS — R10.2 CHRONIC PELVIC PAIN IN FEMALE: ICD-10-CM

## 2025-05-27 DIAGNOSIS — R27.8 COORDINATION ABNORMAL: ICD-10-CM

## 2025-05-27 PROCEDURE — 97112 NEUROMUSCULAR REEDUCATION: CPT | Performed by: PHYSICAL THERAPIST

## 2025-05-27 PROCEDURE — 97530 THERAPEUTIC ACTIVITIES: CPT | Performed by: PHYSICAL THERAPIST

## 2025-05-27 RX ORDER — ESTRADIOL 0.1 MG/G
1 CREAM VAGINAL DAILY
Qty: 42.5 G | Refills: 1 | Status: SHIPPED | OUTPATIENT
Start: 2025-05-27 | End: 2026-05-27

## 2025-05-27 NOTE — PROGRESS NOTES
Outpatient Rehab    Physical Therapy Progress Note    Patient Name: Joselo Schaefer  MRN: [id number]  YOB: 1985  Encounter Date: 5/27/2025    Therapy Diagnosis:   Encounter Diagnoses   Name Primary?    Pelvic floor dysfunction in female Yes    Chronic pelvic pain in female     Coordination abnormal      Physician: Tatianna Wu,*    Physician Orders: Eval and Treat  Medical Diagnosis: Pelvic floor dysfunction in female  Chronic pelvic pain in female    Visit # / Visits Authorized:  5 / 12  Insurance Authorization Period: 4/15/2025 to 12/31/2025  Date of Evaluation: 4/1/2025  Plan of Care Certification:       PT/PTA:     Number of PTA visits since last PT visit:   Time In: 1300   Time Out: 1349  Total Time (in minutes): 49   Total Billable Time (in minutes):      FOTO:  Intake Score:  %  Survey Score 2:  %  Survey Score 3:  %    Precautions:       Subjective   Soreness and bloating after manual therapy last visit. Took a laxative to help with bowel evacuation. Pain has been beeter the last 2 days..  Pain reported as 4/10.      Objective            Treatment:  Balance/Neuromuscular Re-Education  NMR 1: Perineal RUSI with slightly improved pf mobility and awareness and much improved pf contraction  Therapeutic Activity  TA 1: Educated on gastro colic reflex and improved bowel habits.    Time Entry(in minutes):  Neuromuscular Re-Education Time Entry: 30  Therapeutic Activity Time Entry: 19    Assessment & Plan   Assessment: Improved pain overall. Good understanding of PT Recs.       The patient will continue to benefit from skilled outpatient physical therapy in order to address the deficits listed in the problem list on the initial evaluation, provide patient and family education, and maximize the patients level of independence in the home and community environments.     The patient's spiritual, cultural, and educational needs were considered, and the patient is agreeable to the plan  of care and goals.           Plan: Progressive PF manual therapy and mobility.    Goals:   Active       Long term goals:       Patient will deny pelvic pain.  (Progressing)       Start:  04/16/25    Expected End:  07/09/25            Patient will deny constipation.  (Progressing)       Start:  04/16/25    Expected End:  07/09/25            Patient will deny pain with vaginal penetration.  (Progressing)       Start:  04/16/25    Expected End:  07/09/25               Short term goals:       Patient will be independent with pelvic floor mobility.  (Progressing)       Start:  04/16/25    Expected End:  07/09/25            Patient will report pelvic pain of 7/10 at its worst.  (Met)       Start:  04/16/25    Expected End:  07/09/25    Resolved:  05/27/25         Patient will be independent with use of vaginal dilators.  (Progressing)       Start:  04/16/25    Expected End:  07/09/25                Alvin Garcia, PT

## 2025-05-27 NOTE — PATIENT INSTRUCTIONS
Bowel movements:   - good water intake, continue with Miralax  - try to avoid a laxative  - fiber supplement - powder form - Benefiber, Citrucel, Metamucil - start small then increase as you can   - we want stool to be soft and easy to pass     Continue with exercise and good water intake (1/2 of your body weight in water)       Gastro colic reflex: morning routine  Wake up - drink some water   Go urinate if you need  Return to bed or a chair - comfortable   Abdominal massage using 3 flat fingers - ILU - 2-3 min as often as you need  Do not suppress the urge for a bowel movement - ever  15 min after you are awake - NOTICE the urge for a bowel movement  Attempt to have bowel movement - do belly breathing - Put your feet on a stool - squatty potty   Relax the pelvic floor do not strain - imagine the rectum like a flower blooming

## 2025-05-28 RX ORDER — ESTRADIOL 0.1 MG/G
1 CREAM VAGINAL DAILY
Qty: 42.5 G | Refills: 1 | OUTPATIENT
Start: 2025-05-28 | End: 2026-05-28

## 2025-05-28 NOTE — TELEPHONE ENCOUNTER
Refill Decision Note  Quick DC. Request already responded to by other means (e.g. phone or fax)    Joselo Schaefer  is requesting a refill authorization.  Brief Assessment and Rationale for Refill:  Quick Discontinue     Medication Therapy Plan:       Medication Reconciliation Completed: No   Comments:           Note composed:6:52 AM 05/28/2025

## 2025-06-02 ENCOUNTER — OFFICE VISIT (OUTPATIENT)
Dept: UROGYNECOLOGY | Facility: CLINIC | Age: 40
End: 2025-06-02
Payer: COMMERCIAL

## 2025-06-02 ENCOUNTER — PATIENT MESSAGE (OUTPATIENT)
Dept: UROGYNECOLOGY | Facility: CLINIC | Age: 40
End: 2025-06-02

## 2025-06-02 VITALS
WEIGHT: 125 LBS | DIASTOLIC BLOOD PRESSURE: 74 MMHG | SYSTOLIC BLOOD PRESSURE: 106 MMHG | HEART RATE: 68 BPM | HEIGHT: 63 IN | BODY MASS INDEX: 22.15 KG/M2

## 2025-06-02 DIAGNOSIS — N95.1 MENOPAUSAL SYMPTOMS: ICD-10-CM

## 2025-06-02 DIAGNOSIS — M62.89 PELVIC FLOOR DYSFUNCTION IN FEMALE: ICD-10-CM

## 2025-06-02 DIAGNOSIS — L81.9 HYPOPIGMENTATION: Primary | ICD-10-CM

## 2025-06-02 DIAGNOSIS — R35.0 URINARY FREQUENCY: ICD-10-CM

## 2025-06-02 DIAGNOSIS — Z87.42 HISTORY OF ENDOMETRIOSIS: ICD-10-CM

## 2025-06-02 DIAGNOSIS — M62.89 PELVIC FLOOR TENSION: ICD-10-CM

## 2025-06-02 PROCEDURE — 1159F MED LIST DOCD IN RCRD: CPT | Mod: CPTII,S$GLB,, | Performed by: NURSE PRACTITIONER

## 2025-06-02 PROCEDURE — 87086 URINE CULTURE/COLONY COUNT: CPT | Performed by: NURSE PRACTITIONER

## 2025-06-02 PROCEDURE — 3074F SYST BP LT 130 MM HG: CPT | Mod: CPTII,S$GLB,, | Performed by: NURSE PRACTITIONER

## 2025-06-02 PROCEDURE — 99214 OFFICE O/P EST MOD 30 MIN: CPT | Mod: 25,S$GLB,, | Performed by: NURSE PRACTITIONER

## 2025-06-02 PROCEDURE — 3078F DIAST BP <80 MM HG: CPT | Mod: CPTII,S$GLB,, | Performed by: NURSE PRACTITIONER

## 2025-06-02 PROCEDURE — 3008F BODY MASS INDEX DOCD: CPT | Mod: CPTII,S$GLB,, | Performed by: NURSE PRACTITIONER

## 2025-06-02 PROCEDURE — 51701 INSERT BLADDER CATHETER: CPT | Mod: S$GLB,,, | Performed by: NURSE PRACTITIONER

## 2025-06-02 PROCEDURE — 99999 PR PBB SHADOW E&M-EST. PATIENT-LVL IV: CPT | Mod: PBBFAC,,, | Performed by: NURSE PRACTITIONER

## 2025-06-02 PROCEDURE — 1160F RVW MEDS BY RX/DR IN RCRD: CPT | Mod: CPTII,S$GLB,, | Performed by: NURSE PRACTITIONER

## 2025-06-02 RX ORDER — BUSPIRONE HYDROCHLORIDE 10 MG/1
10 TABLET ORAL 2 TIMES DAILY
COMMUNITY

## 2025-06-02 RX ORDER — CLOBETASOL PROPIONATE 0.5 MG/G
OINTMENT TOPICAL 2 TIMES DAILY
Qty: 45 G | Refills: 2 | Status: SHIPPED | OUTPATIENT
Start: 2025-06-02

## 2025-06-03 ENCOUNTER — CLINICAL SUPPORT (OUTPATIENT)
Dept: REHABILITATION | Facility: HOSPITAL | Age: 40
End: 2025-06-03
Payer: COMMERCIAL

## 2025-06-03 DIAGNOSIS — R27.8 COORDINATION ABNORMAL: ICD-10-CM

## 2025-06-03 DIAGNOSIS — R10.2 CHRONIC PELVIC PAIN IN FEMALE: ICD-10-CM

## 2025-06-03 DIAGNOSIS — G89.29 CHRONIC PELVIC PAIN IN FEMALE: ICD-10-CM

## 2025-06-03 DIAGNOSIS — M62.89 PELVIC FLOOR DYSFUNCTION IN FEMALE: Primary | ICD-10-CM

## 2025-06-03 PROCEDURE — 97530 THERAPEUTIC ACTIVITIES: CPT | Performed by: PHYSICAL THERAPIST

## 2025-06-03 PROCEDURE — 97140 MANUAL THERAPY 1/> REGIONS: CPT | Performed by: PHYSICAL THERAPIST

## 2025-06-04 ENCOUNTER — RESULTS FOLLOW-UP (OUTPATIENT)
Dept: UROGYNECOLOGY | Facility: CLINIC | Age: 40
End: 2025-06-04

## 2025-06-04 ENCOUNTER — PATIENT MESSAGE (OUTPATIENT)
Dept: UROGYNECOLOGY | Facility: CLINIC | Age: 40
End: 2025-06-04
Payer: COMMERCIAL

## 2025-06-04 DIAGNOSIS — Z87.42 HISTORY OF ENDOMETRIOSIS: Primary | ICD-10-CM

## 2025-06-04 LAB — BACTERIA UR CULT: NO GROWTH

## 2025-06-04 RX ORDER — DROSPIRENONE AND ETHINYL ESTRADIOL 0.02-3(28)
KIT ORAL
Qty: 84 TABLET | Refills: 4 | Status: SHIPPED | OUTPATIENT
Start: 2025-06-04

## 2025-06-05 ENCOUNTER — PATIENT MESSAGE (OUTPATIENT)
Dept: REHABILITATION | Facility: HOSPITAL | Age: 40
End: 2025-06-05
Payer: COMMERCIAL

## 2025-06-05 ENCOUNTER — PATIENT MESSAGE (OUTPATIENT)
Dept: UROGYNECOLOGY | Facility: CLINIC | Age: 40
End: 2025-06-05
Payer: COMMERCIAL

## 2025-06-05 ENCOUNTER — TELEPHONE (OUTPATIENT)
Dept: UROGYNECOLOGY | Facility: CLINIC | Age: 40
End: 2025-06-05
Payer: COMMERCIAL

## 2025-06-06 ENCOUNTER — PATIENT MESSAGE (OUTPATIENT)
Dept: UROGYNECOLOGY | Facility: CLINIC | Age: 40
End: 2025-06-06
Payer: COMMERCIAL

## 2025-06-09 ENCOUNTER — PATIENT MESSAGE (OUTPATIENT)
Dept: UROGYNECOLOGY | Facility: CLINIC | Age: 40
End: 2025-06-09
Payer: COMMERCIAL

## 2025-06-09 ENCOUNTER — CLINICAL SUPPORT (OUTPATIENT)
Dept: OBSTETRICS AND GYNECOLOGY | Facility: CLINIC | Age: 40
End: 2025-06-09
Payer: COMMERCIAL

## 2025-06-09 DIAGNOSIS — N95.1 MENOPAUSAL SYMPTOMS: Primary | ICD-10-CM

## 2025-06-09 NOTE — PROGRESS NOTES
Personal Information    Full Name: Joselo Schaefer 1985    PCP- Dr. Keysha Arana    Medical History    Do you have a chronic medical condition? (e.g., diabetes, hypertension, thyroid issues)   If yes, please specify:   Yes - Fibromyalgia    2.   Do you have a history of hormone- related conditions? (e.g., endometriosis, breast cancer, or PCOS)   If yes, please explain:   Yes - Endometriosis     3.   Have you previously or are you currently taking hormone replacement therapy?   If yes, please list:   Yes - Estradiol 1 mg daily & Vaginal estrogen cream     Menstrual History    At what age did you begin menstruating?   10    2.   Have you experienced any changes in your menstrual cycle in the last year?   If yes, please describe:   Yes - Hysterectomy 12/2024    3.   When was your last menstrual period or age of last period?   N/A    4.   Have you had a hysterectomy or ablation?   Yes    5.   Do you still have your ovaries?   If yes, month or year:  Yes - Right ovary removed     Symptoms Assesments      Are you experiencing any of the following symptoms:  Hot Flashes: Yes   Night Sweats: Yes   Vaginal Dryness or Pain/ Discomfort with [place]: Yes   Mood Swings: Yes   Anxiety or Depression: Yes Buspar 10 mg daily    Sleep Disturbances: Yes   Fatigue: Yes   Weight Gain: Yes   Joint or Muscle Pain: Yes   Memory Issues, Brain Fog or Loss of Focus: Yes   Decreased Interest in Sex (Low Libido): Yes   Vaginal itching/Burning     Lifestyle Factors    How would you describe your diet?  Balanced    2.   How often do you exercise?   Regularly    3.   Do you smoke or consume alcohol?   If yes, please specify frequency:   Yes - Social Drinker- Does not smoke    4.   How would you rate your stress levels?   Moderate    Family History    Is there a family history of menopause-related conditions? (e.g., osteoporosis, breast cancer)  If yes, please specify  Yes - Maternal Breast Cancer    2.   Is there a family  history of heart disease?   If yes, please specify   Yes - Mother Heart Diseases/Stroke     3.   Have you had a colonoscopy?  If yes, month or year:  No    4.   Have you had a mammogram?  If yes, month or year:  Yes - 6/2024    5.   Have you had an Annual/ Pap?   If yes, month or year:   Yes - 10/2024    6.   BMD (If patient is over 50)    N/A  -----------------------------------------  Spoke with patient for a total of 30 minutes during virtual visit.  Patient was guided through expectations and treatment options.     Questions answered. Encouraged to send message or call office with any questions/concerns. Verbalized understanding.       Qian

## 2025-06-23 ENCOUNTER — PATIENT MESSAGE (OUTPATIENT)
Dept: UROGYNECOLOGY | Facility: CLINIC | Age: 40
End: 2025-06-23
Payer: COMMERCIAL

## 2025-06-24 ENCOUNTER — CLINICAL SUPPORT (OUTPATIENT)
Dept: REHABILITATION | Facility: HOSPITAL | Age: 40
End: 2025-06-24
Payer: COMMERCIAL

## 2025-06-24 DIAGNOSIS — M62.89 PELVIC FLOOR DYSFUNCTION IN FEMALE: Primary | ICD-10-CM

## 2025-06-24 DIAGNOSIS — R10.2 CHRONIC PELVIC PAIN IN FEMALE: ICD-10-CM

## 2025-06-24 DIAGNOSIS — G89.29 CHRONIC PELVIC PAIN IN FEMALE: ICD-10-CM

## 2025-06-24 DIAGNOSIS — R27.8 COORDINATION ABNORMAL: ICD-10-CM

## 2025-06-24 PROCEDURE — 97530 THERAPEUTIC ACTIVITIES: CPT | Performed by: PHYSICAL THERAPIST

## 2025-06-24 PROCEDURE — 97112 NEUROMUSCULAR REEDUCATION: CPT | Performed by: PHYSICAL THERAPIST

## 2025-06-24 NOTE — PATIENT INSTRUCTIONS
Gastro colic reflex: morning routine  Wake up - drink some water   Go urinate if you need  Return to bed or a chair - comfortable   Abdominal massage using 3 flat fingers - ILU - 2-3 min as often as you need  Do not suppress the urge for a bowel movement - ever  15 min after you are awake - NOTICE the urge for a bowel movement  Attempt to have BM - Put your feet on a stool  Relax the pelvic floor do not strain - imagine the rectum like a flower blooming    Talk to providers about:  - Fiber - powder supplement - Citrucel, Metamucil, Benefiber - start slow and gradually increase every 3-5 days   - Magnesium Citrate - capsules or gummies - 2 at night

## 2025-06-24 NOTE — PROGRESS NOTES
Outpatient Rehab    Physical Therapy Visit    Patient Name: Joselo Schaefer  MRN: [id number]  YOB: 1985  Encounter Date: 6/24/2025    Therapy Diagnosis:   Encounter Diagnoses   Name Primary?    Pelvic floor dysfunction in female Yes    Chronic pelvic pain in female     Coordination abnormal      Physician: Tatianna Wu,*    Physician Orders: Eval and Treat  Medical Diagnosis: Pelvic floor dysfunction in female  Chronic pelvic pain in female    Visit # 8/12 Visits Authorized:  7 / 12  Insurance Authorization Period: 4/15/2025 to 12/31/2025  Date of Evaluation: 4/1/2025  Plan of Care Certification:       PT/PTA:     Number of PTA visits since last PT visit:   Time In: 1124   Time Out: 1153  Total Time (in minutes): 29   Total Billable Time (in minutes):  29    FOTO:  Intake Score:  %  Survey Score 2:  %  Survey Score 3:  %    Precautions:       Subjective   No longer on HRT, now on birth control. Pelvic pain is better - no longer 10/10. Has used suppositories 3x - using them less. Bowels are not moving well. Keep using Miralax..  Pain reported as 4/10.      Objective            Treatment:  Balance/Neuromuscular Re-Education  NMR 3: Educated on function of the pelvic floor with pelvic floor down training and awareness.  Therapeutic Activity  TA 2: Educated on gastro colic reflex to assist with bowels  TA 3: Educated on speaking with her providers for OTC meds for constipation  TA 4: Educated on speaking to provider about Veozah to help with vasomotor symptoms of menopause  TA 5: Educated on abdominal massage to assist wtih bowel movements as well.    Time Entry(in minutes):  Neuromuscular Re-Education Time Entry: 14  Therapeutic Activity Time Entry: 15    Assessment & Plan   Assessment: We deferred internal therapy today due to constipation and generally feeling unwell. Cancelled her next visit due to having a labial biopsy the day prior. Good understanding of physical therapist  recommendations and will work on improved bowel management.        The patient will continue to benefit from skilled outpatient physical therapy in order to address the deficits listed in the problem list on the initial evaluation, provide patient and family education, and maximize the patients level of independence in the home and community environments.     The patient's spiritual, cultural, and educational needs were considered, and the patient is agreeable to the plan of care and goals.           Plan: Monitor results of biopsy, monitor consitpation    Goals:   Active       Long term goals:       Patient will deny pelvic pain.  (Progressing)       Start:  04/16/25    Expected End:  07/09/25            Patient will deny constipation.  (Progressing)       Start:  04/16/25    Expected End:  07/09/25            Patient will deny pain with vaginal penetration.  (Progressing)       Start:  04/16/25    Expected End:  07/09/25               Short term goals:       Patient will be independent with pelvic floor mobility.  (Progressing)       Start:  04/16/25    Expected End:  07/09/25            Patient will report pelvic pain of 7/10 at its worst.  (Met)       Start:  04/16/25    Expected End:  07/09/25    Resolved:  05/27/25         Patient will be independent with use of vaginal dilators.  (Progressing)       Start:  04/16/25    Expected End:  07/09/25                Alvin Garcia, PT

## 2025-06-30 ENCOUNTER — PATIENT MESSAGE (OUTPATIENT)
Dept: UROGYNECOLOGY | Facility: CLINIC | Age: 40
End: 2025-06-30

## 2025-06-30 ENCOUNTER — OFFICE VISIT (OUTPATIENT)
Dept: UROGYNECOLOGY | Facility: CLINIC | Age: 40
End: 2025-06-30
Payer: COMMERCIAL

## 2025-06-30 VITALS
BODY MASS INDEX: 22.27 KG/M2 | HEART RATE: 75 BPM | HEIGHT: 63 IN | DIASTOLIC BLOOD PRESSURE: 78 MMHG | WEIGHT: 125.69 LBS | SYSTOLIC BLOOD PRESSURE: 111 MMHG

## 2025-06-30 DIAGNOSIS — Z87.42 HISTORY OF ENDOMETRIOSIS: ICD-10-CM

## 2025-06-30 DIAGNOSIS — G47.00 INSOMNIA, UNSPECIFIED TYPE: ICD-10-CM

## 2025-06-30 DIAGNOSIS — M62.89 PELVIC FLOOR TENSION: Primary | ICD-10-CM

## 2025-06-30 PROCEDURE — 3008F BODY MASS INDEX DOCD: CPT | Mod: CPTII,S$GLB,, | Performed by: NURSE PRACTITIONER

## 2025-06-30 PROCEDURE — 1159F MED LIST DOCD IN RCRD: CPT | Mod: CPTII,S$GLB,, | Performed by: NURSE PRACTITIONER

## 2025-06-30 PROCEDURE — 1160F RVW MEDS BY RX/DR IN RCRD: CPT | Mod: CPTII,S$GLB,, | Performed by: NURSE PRACTITIONER

## 2025-06-30 PROCEDURE — 3074F SYST BP LT 130 MM HG: CPT | Mod: CPTII,S$GLB,, | Performed by: NURSE PRACTITIONER

## 2025-06-30 PROCEDURE — 99999 PR PBB SHADOW E&M-EST. PATIENT-LVL IV: CPT | Mod: PBBFAC,,, | Performed by: NURSE PRACTITIONER

## 2025-06-30 PROCEDURE — 99214 OFFICE O/P EST MOD 30 MIN: CPT | Mod: S$GLB,,, | Performed by: NURSE PRACTITIONER

## 2025-06-30 PROCEDURE — 3078F DIAST BP <80 MM HG: CPT | Mod: CPTII,S$GLB,, | Performed by: NURSE PRACTITIONER

## 2025-06-30 RX ORDER — HYDROXYZINE HYDROCHLORIDE 10 MG/1
10 TABLET, FILM COATED ORAL NIGHTLY
Qty: 30 TABLET | Refills: 11 | Status: SHIPPED | OUTPATIENT
Start: 2025-06-30

## 2025-06-30 NOTE — PATIENT INSTRUCTIONS
Pelvic pain  --documented endometrial implant in op note of hysterectomy  --continue ocp  -- continue pelvic floor PT    2. Pelvic floor tension  --continue pelvic floor PT    3. Continue miralax daily    4.hypopigmentation around clitoral lucio   --twice weekly in  AM apply steroid ointment/cream:  Apply dime-sized amount with finger to vaginal opening, inner lips, and all external areas (including around anus) that are irritated.  DO NOT APPLY INTERNALLY.   --twice weekly in  PM apply estrogen cream.  Apply dime-sized amount with finger to vaginal opening, inner lips, and all external areas (including around anus) that are irritated. Also apply small amount inside vagina with finger (insert to knuckle).    5. Referral to women's wellness for hrt management    6. Insomnia  --trial hydrozyzine 10 mg nightly    7. RTC 6 months for follow up

## 2025-06-30 NOTE — PROGRESS NOTES
[facility], [place], operated by [facility] - UROGYNECOLOGY  [address] [place] [id number]    Joselo Schaefer  [id number]  1985 June 30, 2025    Consulting Physician: No ref. provider found     Primary M.AMRITA.: Keysha Arana, ROWENA    No chief complaint on file.  Pt had hysterectomy back in December, recently started going to [facility] 1x a week for about 5 weeks now. The pain is still there without getting any better.  Pain is worse right after PT, gets better, but than comes right back. Pt was prescribed a valium, baclofen, lidocaine suppository, taking it twice a day now. It does help but is very expensive. Pt saw Dr. Tatianna Streeter and PT is with Dr. Esquivel.     06/02/2025    1)  UI:  (--) KATHRYN (--) UUI.  (--) pad Daytime frequency: Q 1 hours.  Nocturia: Yes: 2/night.   (+) dysuria,  (--) hematuria,  (--) frequent UTIs.  (--) complete bladder emptying. Pt has been experiencing burning, itching, and irritation in general, since before the surgery  but has now become worse after the surgery.     2)  POP:  Absent.  introitus.  Symptoms: (--) vaginal bleeding. (+) vaginal discharge. (+) sexually active.  (+) dyspareunia.  (+)  Vaginal dryness.  (+) vaginal estrogen use. Pt is using the estrogen cream once a day on the outside only.   No pop  PVR: 10 mL  3)  BM:  (+) constipation/straining.  (--) chronic diarrhea. (--) hematochezia.  (--) fecal incontinence.  (--) fecal smearing/urgency.  (--) rectal prolapse.  (--) complete evacuation.  Pt is taking miralex everyday.       Changes since last visit:  Pelvic pain  --taking ocp-- pain is lessened  --going to pelvic floor PT    2. Pelvic floor tension  --going to pelvic floor PT    3. Menopausal symptoms  --increased hot flashes  --has increased insomnia-- sleeping 3-4 hours    4.hypopigmentation around clitoral lucio  --using steroid and estrogen cream    5. Fibromyalgia  --has had buspar increased to 10 mg  --symptoms worsened after she stopped estradiol    6.  Constipation  --taking miralax daily and smooth move tea    Past Medical History  Past Medical History:   Diagnosis Date    Endometriosis of uterus     Fibromyalgia     Hormone disorder         Past Surgical History  Past Surgical History:   Procedure Laterality Date     SECTION      x2    CHOLECYSTECTOMY      CYSTOSCOPY WITH HYDRODISTENSION OF BLADDER N/A 2024    Procedure: CYSTOSCOPY, WITH BLADDER HYDRODISTENSION W/DMSO;  Surgeon: Dante Cadet MD;  Location: [facility] OR;  Service: Urology;  Laterality: N/A;    DILATION OF URETHRA N/A 2024    Procedure: DILATION, URETHRA - BU;  Surgeon: Dante Cadet MD;  Location: [facility] OR;  Service: Urology;  Laterality: N/A;    EYE SURGERY      as a child    HYSTERECTOMY  Dec 13    HYSTERECTOMY, TOTAL, LAPAROSCOPIC, WITH SALPINGECTOMY Bilateral 2024    Procedure: HYSTERECTOMY,TOTAL,LAPAROSCOPIC,WITH SALPINGECTOMY;  Surgeon: Kira Godwin MD;  Location: [facility] OR;  Service: OB/GYN;  Laterality: Bilateral;    LAPAROSCOPIC CHOLECYSTECTOMY      LAPAROSCOPIC OOPHORECTOMY Right 2024    Procedure: OOPHORECTOMY, LAPAROSCOPIC;  Surgeon: Kira Godwin MD;  Location: [facility] OR;  Service: OB/GYN;  Laterality: Right;    SURGICAL REMOVAL OF ENDOMETRIOSIS N/A 2024    Procedure: DESTRUCTION, ENDOMETRIOSIS;  Surgeon: Kira Godwin MD;  Location: [facility] OR;  Service: OB/GYN;  Laterality: N/A;    TUBAL LIGATION          Hysterectomy: Yes  Date: 2024.  Indication: endometriosis stage 4.    Type: laparoscopic  Cervix present: No  Ovaries present: Yes - left one only   Other procedures at time of hysterectomy:  no    Past Ob History    C/s x 2   Largest infant weight: 7-8 lbs       Gynecologic History  LMP: Patient's last menstrual period was 2024 (exact date).  Age of menarche: 9 y/o   Age of menopause: n/a  Menstrual history: LMP was 1 week before hysterectomy   Pap test: 10/2024.   "History of abnormal paps: No.  History of STIs:  No  Mammogram: Date of last: 3/2024.  Result: Normal  Colonoscopy: n/a  DEXA:  n/a    Family History  Family History   Problem Relation Name Age of Onset    Hypertension Mother Pamela damico     Stroke Mother Pamela damico     Diabetes Father Lg     Migraines Father Lg     Depression Father Lg     Breast cancer Maternal Aunt Lianet     Cancer Maternal Aunt Lianet     Ovarian cancer Maternal Aunt Lianet     Breast cancer Maternal Aunt Lianet     Cancer Maternal Aunt Lianet     Asthma Son Francisco     Rashes / Skin problems Sister Stephanie     Depression Sister Stephanie     Colon cancer Neg Hx        Colon CA: No  Breast CA: Yes - maternal aunt   GYN CA: Yes - ovarian maternal aunt    CA: No    Social History  Tobacco Use History[1].  Cessation date: n/a  Social History     Substance and Sexual Activity   Alcohol Use No   .    Social History     Substance and Sexual Activity   Drug Use No   .      Allergies  Review of patient's allergies indicates:   Allergen Reactions    Diflucan [fluconazole] Other (See Comments)     Spots on face       Medications  Medications Ordered Prior to Encounter[2]    Review of Systems A 14 point ROS was reviewed with pertinent positives as noted above in the history of present illness.      Constitutional: negative  Eyes: negative  Endocrine: negative  Gastrointestinal: negative  Cardiovascular: negative  Respiratory: negative  Allergic/Immunologic: negative  Integumentary: negative  Psychiatric: negative  Musculoskeletal: negative   Ear/Nose/Throat: negative  Neurologic: negative  Genitourinary: SEE HPI  Hematologic/Lymphatic: negative   Breast: negative    Urogynecologic Exam  /78 (BP Location: Right arm, Patient Position: Sitting)   Pulse 75   Ht 5' 3" (1.6 m)   Wt 57 kg (125 lb 10.6 oz)   LMP 12/08/2024 (Exact Date)   BMI 22.26 kg/m²     GENERAL APPEARANCE:  The patient is well-developed, well-nourished.   Neck:  Supple " with no thyromegaly, no carotid bruits.  Heart:  Regular rate and rhythm, no murmurs, rubs or gallops.  Lungs:  Clear.  No CVA tenderness.  Abdomen:  Soft, nontender, nondistended, no hepatosplenomegaly.  Incisions:  lsc sites well healed    PELVIC:    External genitalia:  Normal Bartholins, Skenes and labia bilaterally.  Hypopigmentation resolved  Urethra:  No caruncle, diverticulum or masses.  (--) hypermobility.    Rest of pelvic deferred    Impression    1. Pelvic floor tension    2. Insomnia, unspecified type    3. History of endometriosis          Initial Plan  The patient was counseled regarding these issues. The patient was given a summary sheet containing each of these issues with possible options for evaluation and management. When appropriate, we also reviewed computer-generated diagrams specific to their diagnoses..  All questions were addressed to the patient's satisfaction.     Pelvic pain  --documented endometrial implant in op note of hysterectomy  --continue ocp  -- continue pelvic floor PT    2. Pelvic floor tension  --continue pelvic floor PT    3. Continue miralax daily    4.hypopigmentation around clitoral lucio   --twice weekly in  AM apply steroid ointment/cream:  Apply dime-sized amount with finger to vaginal opening, inner lips, and all external areas (including around anus) that are irritated.  DO NOT APPLY INTERNALLY.   --twice weekly in  PM apply estrogen cream.  Apply dime-sized amount with finger to vaginal opening, inner lips, and all external areas (including around anus) that are irritated. Also apply small amount inside vagina with finger (insert to knuckle).    5. Referral to women's wellness for hrt management    6. Insomnia  --trial hydrozyzine 10 mg nightly    7. RTC 6 months for follow up    I spent a total of 30 minutes on the day of the visit.  This includes face to face time and non-face to face time preparing to see the patient (eg, review of tests), obtaining and/or  reviewing separately obtained history, documenting clinical information in the electronic or other health record, independently interpreting results and communicating results to the patient/family/caregiver, or care coordinator.     Kim Christina  Female Pelvic Medicine and Reconstructive Surgery  [facility] [place], [place]         [1]   Social History  Tobacco Use   Smoking Status Never    Passive exposure: Never   Smokeless Tobacco Never   [2]   Current Outpatient Medications on File Prior to Visit   Medication Sig Dispense Refill    b complex vitamins tablet Take 1 tablet by mouth once daily.      busPIRone (BUSPAR) 10 MG tablet Take 10 mg by mouth 2 (two) times a day.      clobetasol 0.05% (TEMOVATE) 0.05 % Oint Apply topically 2 (two) times daily. 45 g 2    drospirenone-ethinyl estradioL (BLADIMIR) 3-0.02 mg per tablet Take continuously skipping placebo pills 84 tablet 4    estradioL (ESTRACE) 0.01 % (0.1 mg/gram) vaginal cream Place 1 g vaginally once daily. 42.5 g 1    lacosamide (VIMPAT) 50 mg Tab Take 50 mg by mouth 2 (two) times daily.      vitamin D (VITAMIN D3) 1000 units Tab Take 10,000 Units by mouth once daily.      busPIRone (BUSPAR) 5 MG Tab Take 5 mg by mouth once daily. (Patient not taking: Reported on 6/30/2025)      terconazole (TERAZOL 7) 0.4 % Crea Place 1 applicator vaginally every evening. 45 g 0     No current facility-administered medications on file prior to visit.

## 2025-07-11 ENCOUNTER — PATIENT MESSAGE (OUTPATIENT)
Dept: UROGYNECOLOGY | Facility: CLINIC | Age: 40
End: 2025-07-11
Payer: COMMERCIAL

## 2025-07-11 DIAGNOSIS — R35.0 URINARY FREQUENCY: Primary | ICD-10-CM

## 2025-07-11 PROCEDURE — 87086 URINE CULTURE/COLONY COUNT: CPT | Performed by: NURSE PRACTITIONER

## 2025-07-13 RX ORDER — VIBEGRON 75 MG/1
75 TABLET, FILM COATED ORAL DAILY
Qty: 30 TABLET | Refills: 11 | Status: SHIPPED | OUTPATIENT
Start: 2025-07-13 | End: 2026-07-13

## 2025-07-14 ENCOUNTER — PATIENT MESSAGE (OUTPATIENT)
Dept: REHABILITATION | Facility: HOSPITAL | Age: 40
End: 2025-07-14
Payer: COMMERCIAL

## 2025-08-09 DIAGNOSIS — Z87.42 HISTORY OF ENDOMETRIOSIS: ICD-10-CM

## 2025-08-11 RX ORDER — DROSPIRENONE AND ETHINYL ESTRADIOL 0.02-3(28)
KIT ORAL
Qty: 84 TABLET | Refills: 4 | Status: SHIPPED | OUTPATIENT
Start: 2025-08-11

## 2025-08-27 ENCOUNTER — TELEPHONE (OUTPATIENT)
Facility: CLINIC | Age: 40
End: 2025-08-27
Payer: COMMERCIAL

## 2025-09-05 ENCOUNTER — TELEPHONE (OUTPATIENT)
Dept: OBSTETRICS AND GYNECOLOGY | Facility: CLINIC | Age: 40
End: 2025-09-05
Payer: COMMERCIAL